# Patient Record
Sex: FEMALE | Race: WHITE
[De-identification: names, ages, dates, MRNs, and addresses within clinical notes are randomized per-mention and may not be internally consistent; named-entity substitution may affect disease eponyms.]

---

## 2017-06-02 ENCOUNTER — HOSPITAL ENCOUNTER (EMERGENCY)
Dept: HOSPITAL 1 - ED | Age: 42
Discharge: HOME | End: 2017-06-02
Payer: COMMERCIAL

## 2017-06-02 VITALS — HEIGHT: 65 IN | WEIGHT: 195.99 LBS | BODY MASS INDEX: 32.65 KG/M2

## 2017-06-02 VITALS — SYSTOLIC BLOOD PRESSURE: 127 MMHG | DIASTOLIC BLOOD PRESSURE: 71 MMHG

## 2017-06-02 DIAGNOSIS — M54.5: ICD-10-CM

## 2017-06-02 DIAGNOSIS — G89.29: Primary | ICD-10-CM

## 2019-01-07 ENCOUNTER — HOSPITAL ENCOUNTER (EMERGENCY)
Dept: HOSPITAL 1 - ED | Age: 44
Discharge: LEFT BEFORE DISCHARGE | End: 2019-01-07
Payer: COMMERCIAL

## 2019-01-07 VITALS — HEIGHT: 66 IN | WEIGHT: 185 LBS | BODY MASS INDEX: 29.73 KG/M2

## 2019-01-07 VITALS — SYSTOLIC BLOOD PRESSURE: 141 MMHG | DIASTOLIC BLOOD PRESSURE: 84 MMHG

## 2019-01-07 DIAGNOSIS — Z53.21: Primary | ICD-10-CM

## 2019-09-01 ENCOUNTER — HOSPITAL ENCOUNTER (EMERGENCY)
Dept: HOSPITAL 1 - ED | Age: 44
LOS: 1 days | Discharge: HOME | End: 2019-09-02
Payer: MEDICAID

## 2019-09-01 VITALS
HEIGHT: 65 IN | WEIGHT: 177 LBS | HEIGHT: 65 IN | BODY MASS INDEX: 29.49 KG/M2 | BODY MASS INDEX: 29.49 KG/M2 | WEIGHT: 177 LBS
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
## 2019-09-01 DIAGNOSIS — S39.012A: Primary | ICD-10-CM
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
## 2019-09-01 DIAGNOSIS — Z98.890: ICD-10-CM

## 2019-09-01 DIAGNOSIS — F15.10: ICD-10-CM
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
## 2019-09-02 VITALS — SYSTOLIC BLOOD PRESSURE: 123 MMHG | DIASTOLIC BLOOD PRESSURE: 63 MMHG

## 2019-09-02 LAB — AMPHETAMINES UR QL SCN: POSITIVE

## 2019-10-19 ENCOUNTER — HOSPITAL ENCOUNTER (INPATIENT)
Dept: HOSPITAL 26 - MED | Age: 44
LOS: 2 days | Discharge: PSYCHIATRIC OR SUBSTANCE USE FACILITY | DRG: 425 | End: 2019-10-21
Attending: GENERAL PRACTICE | Admitting: GENERAL PRACTICE
Payer: MEDICAID

## 2019-10-19 VITALS — HEIGHT: 66 IN | WEIGHT: 173 LBS | BODY MASS INDEX: 27.8 KG/M2

## 2019-10-19 VITALS — SYSTOLIC BLOOD PRESSURE: 136 MMHG | DIASTOLIC BLOOD PRESSURE: 86 MMHG

## 2019-10-19 DIAGNOSIS — Z59.0: ICD-10-CM
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
## 2019-10-19 DIAGNOSIS — F10.129: ICD-10-CM

## 2019-10-19 DIAGNOSIS — D72.829: ICD-10-CM

## 2019-10-19 DIAGNOSIS — I10: ICD-10-CM

## 2019-10-19 DIAGNOSIS — F32.9: ICD-10-CM

## 2019-10-19 DIAGNOSIS — E83.42: Primary | ICD-10-CM

## 2019-10-19 DIAGNOSIS — F41.9: ICD-10-CM

## 2019-10-19 DIAGNOSIS — R45.851: ICD-10-CM

## 2019-10-19 DIAGNOSIS — E83.51: ICD-10-CM

## 2019-10-19 LAB
ALBUMIN FLD-MCNC: 3.6 G/DL (ref 3.4–5)
ANION GAP SERPL CALCULATED.3IONS-SCNC: 17 MMOL/L (ref 8–16)
APAP SERPL-MCNC: < 0.5 UG/ML (ref 10–30)
AST SERPL-CCNC: 33 U/L (ref 15–37)
BARBITURATES UR QL SCN: (no result) NG/ML
BASOPHILS # BLD AUTO: 0.1 K/UL (ref 0–0.22)
BASOPHILS NFR BLD AUTO: 0.6 % (ref 0–2)
BENZODIAZ UR QL SCN: (no result) NG/ML
BILIRUB SERPL-MCNC: 0.4 MG/DL (ref 0–1)
BUN SERPL-MCNC: 16 MG/DL (ref 7–18)
BZE UR QL SCN: (no result) NG/ML
CANNABINOIDS UR QL SCN: (no result) NG/ML
CHLORIDE SERPL-SCNC: 105 MMOL/L (ref 98–107)
CO2 SERPL-SCNC: 22.7 MMOL/L (ref 21–32)
CREAT SERPL-MCNC: 0.5 MG/DL (ref 0.6–1.3)
EOSINOPHIL # BLD AUTO: 0.1 K/UL (ref 0–0.4)
EOSINOPHIL NFR BLD AUTO: 0.7 % (ref 0–4)
ERYTHROCYTE [DISTWIDTH] IN BLOOD BY AUTOMATED COUNT: 15.5 % (ref 11.6–13.7)
GFR SERPL CREATININE-BSD FRML MDRD: 172 ML/MIN (ref 90–?)
GLUCOSE SERPL-MCNC: 117 MG/DL (ref 74–106)
HCT VFR BLD AUTO: 42.1 % (ref 36–48)
HGB BLD-MCNC: 14.1 G/DL (ref 12–16)
LYMPHOCYTES # BLD AUTO: 4 K/UL (ref 2.5–16.5)
LYMPHOCYTES NFR BLD AUTO: 26.7 % (ref 20.5–51.1)
MCH RBC QN AUTO: 32 PG (ref 27–31)
MCHC RBC AUTO-ENTMCNC: 34 G/DL (ref 33–37)
MCV RBC AUTO: 95.8 FL (ref 80–94)
MONOCYTES # BLD AUTO: 0.8 K/UL (ref 0.8–1)
MONOCYTES NFR BLD AUTO: 5.5 % (ref 1.7–9.3)
NEUTROPHILS # BLD AUTO: 10 K/UL (ref 1.8–7.7)
NEUTROPHILS NFR BLD AUTO: 66.5 % (ref 42.2–75.2)
OPIATES UR QL SCN: (no result) NG/ML
PCP UR QL SCN: (no result) NG/ML
PLATELET # BLD AUTO: 373 K/UL (ref 140–450)
POTASSIUM SERPL-SCNC: 3.7 MMOL/L (ref 3.5–5.1)
RBC # BLD AUTO: 4.4 MIL/UL (ref 4.2–5.4)
SALICYLATES SERPL-MCNC: < 2.8 MG/DL (ref 2.8–20)
SODIUM SERPL-SCNC: 141 MMOL/L (ref 136–145)
WBC # BLD AUTO: 15 K/UL (ref 4.8–10.8)

## 2019-10-19 PROCEDURE — G0482 DRUG TEST DEF 15-21 CLASSES: HCPCS

## 2019-10-19 PROCEDURE — A9153 MULTI-VITAMIN NOS: HCPCS

## 2019-10-19 PROCEDURE — G0480 DRUG TEST DEF 1-7 CLASSES: HCPCS

## 2019-10-19 SDOH — ECONOMIC STABILITY - HOUSING INSECURITY: HOMELESSNESS: Z59.0

## 2019-10-20 VITALS — DIASTOLIC BLOOD PRESSURE: 94 MMHG | SYSTOLIC BLOOD PRESSURE: 150 MMHG

## 2019-10-20 VITALS — DIASTOLIC BLOOD PRESSURE: 79 MMHG | SYSTOLIC BLOOD PRESSURE: 140 MMHG

## 2019-10-20 VITALS — DIASTOLIC BLOOD PRESSURE: 90 MMHG | SYSTOLIC BLOOD PRESSURE: 142 MMHG

## 2019-10-20 LAB
AMYLASE SERPL-CCNC: 42 U/L (ref 25–115)
APPEARANCE UR: CLEAR
BILIRUB UR QL STRIP: NEGATIVE
COLOR UR: (no result)
GLUCOSE UR STRIP-MCNC: NEGATIVE MG/DL
HGB UR QL STRIP: NEGATIVE
LEUKOCYTE ESTERASE UR QL STRIP: NEGATIVE
LIPASE SERPL-CCNC: 106 U/L (ref 73–393)
MAGNESIUM SERPL-MCNC: 1.5 MG/DL (ref 1.8–2.4)
NITRITE UR QL STRIP: NEGATIVE
PH UR STRIP: 7 [PH] (ref 5–9)
PHOSPHATE SERPL-MCNC: 3 MG/DL (ref 2.5–4.9)
PROTHROMBIN TIME: 10 SECS (ref 10.8–13.4)
TSH SERPL DL<=0.05 MIU/L-ACNC: 1.15 UIU/ML (ref 0.34–3.74)

## 2019-10-20 RX ADMIN — SODIUM CHLORIDE SCH MLS/HR: 9 INJECTION, SOLUTION INTRAVENOUS at 23:04

## 2019-10-20 RX ADMIN — MORPHINE SULFATE PRN MG: 2 INJECTION, SOLUTION INTRAMUSCULAR; INTRAVENOUS at 21:27

## 2019-10-20 RX ADMIN — DULOXETINE HYDROCHLORIDE SCH MG: 30 CAPSULE, DELAYED RELEASE ORAL at 21:27

## 2019-10-21 VITALS — SYSTOLIC BLOOD PRESSURE: 128 MMHG | DIASTOLIC BLOOD PRESSURE: 87 MMHG

## 2019-10-21 LAB
ALBUMIN FLD-MCNC: 3.3 G/DL (ref 3.4–5)
ANION GAP SERPL CALCULATED.3IONS-SCNC: 14.5 MMOL/L (ref 8–16)
AST SERPL-CCNC: 45 U/L (ref 15–37)
BASOPHILS # BLD AUTO: 0 K/UL (ref 0–0.22)
BASOPHILS NFR BLD AUTO: 0.3 % (ref 0–2)
BILIRUB SERPL-MCNC: 0.8 MG/DL (ref 0–1)
BUN SERPL-MCNC: 10 MG/DL (ref 7–18)
CHLORIDE SERPL-SCNC: 103 MMOL/L (ref 98–107)
CHOLEST/HDLC SERPL: 3.7 {RATIO} (ref 1–4.5)
CO2 SERPL-SCNC: 25.4 MMOL/L (ref 21–32)
CREAT SERPL-MCNC: 0.5 MG/DL (ref 0.6–1.3)
EOSINOPHIL # BLD AUTO: 0.2 K/UL (ref 0–0.4)
EOSINOPHIL NFR BLD AUTO: 3.1 % (ref 0–4)
ERYTHROCYTE [DISTWIDTH] IN BLOOD BY AUTOMATED COUNT: 15.1 % (ref 11.6–13.7)
GFR SERPL CREATININE-BSD FRML MDRD: 172 ML/MIN (ref 90–?)
GLUCOSE SERPL-MCNC: 93 MG/DL (ref 74–106)
HCT VFR BLD AUTO: 38.2 % (ref 36–48)
HDLC SERPL-MCNC: 42 MG/DL (ref 40–60)
HGB BLD-MCNC: 12.7 G/DL (ref 12–16)
LDLC SERPL CALC-MCNC: 66 MG/DL (ref 60–100)
LYMPHOCYTES # BLD AUTO: 2.7 K/UL (ref 2.5–16.5)
LYMPHOCYTES NFR BLD AUTO: 38 % (ref 20.5–51.1)
MAGNESIUM SERPL-MCNC: 1.9 MG/DL (ref 1.8–2.4)
MCH RBC QN AUTO: 32 PG (ref 27–31)
MCHC RBC AUTO-ENTMCNC: 33 G/DL (ref 33–37)
MCV RBC AUTO: 97.2 FL (ref 80–94)
MONOCYTES # BLD AUTO: 0.7 K/UL (ref 0.8–1)
MONOCYTES NFR BLD AUTO: 9.5 % (ref 1.7–9.3)
NEUTROPHILS # BLD AUTO: 3.5 K/UL (ref 1.8–7.7)
NEUTROPHILS NFR BLD AUTO: 49.1 % (ref 42.2–75.2)
PHOSPHATE SERPL-MCNC: 3.5 MG/DL (ref 2.5–4.9)
PLATELET # BLD AUTO: 272 K/UL (ref 140–450)
POTASSIUM SERPL-SCNC: 3.9 MMOL/L (ref 3.5–5.1)
RBC # BLD AUTO: 3.93 MIL/UL (ref 4.2–5.4)
SODIUM SERPL-SCNC: 139 MMOL/L (ref 136–145)
T4 SERPL-MCNC: 5.5 UG/DL (ref 4.5–12)
TRIGL SERPL-MCNC: 237 MG/DL (ref 30–150)
WBC # BLD AUTO: 7 K/UL (ref 4.8–10.8)

## 2019-10-21 RX ADMIN — SODIUM CHLORIDE SCH MLS/HR: 9 INJECTION, SOLUTION INTRAVENOUS at 14:40

## 2019-10-21 RX ADMIN — MORPHINE SULFATE PRN MG: 2 INJECTION, SOLUTION INTRAMUSCULAR; INTRAVENOUS at 08:29

## 2019-10-21 RX ADMIN — MORPHINE SULFATE PRN MG: 2 INJECTION, SOLUTION INTRAMUSCULAR; INTRAVENOUS at 14:09

## 2019-10-21 RX ADMIN — DULOXETINE HYDROCHLORIDE SCH MG: 30 CAPSULE, DELAYED RELEASE ORAL at 08:24

## 2019-12-05 ENCOUNTER — HOSPITAL ENCOUNTER (INPATIENT)
Dept: HOSPITAL 26 - MED | Age: 44
LOS: 4 days | Discharge: PSYCHIATRIC OR SUBSTANCE USE FACILITY | DRG: 817 | End: 2019-12-09
Attending: FAMILY MEDICINE | Admitting: FAMILY MEDICINE
Payer: MEDICAID

## 2019-12-05 VITALS — HEIGHT: 66 IN | BODY MASS INDEX: 28.12 KG/M2 | WEIGHT: 175 LBS

## 2019-12-05 DIAGNOSIS — F19.10: ICD-10-CM

## 2019-12-05 DIAGNOSIS — I10: ICD-10-CM

## 2019-12-05 DIAGNOSIS — R45.851: ICD-10-CM

## 2019-12-05 DIAGNOSIS — E87.6: ICD-10-CM

## 2019-12-05 DIAGNOSIS — T42.4X2A: Primary | ICD-10-CM

## 2019-12-05 DIAGNOSIS — F32.9: ICD-10-CM

## 2019-12-05 DIAGNOSIS — F41.9: ICD-10-CM

## 2019-12-05 DIAGNOSIS — M54.30: ICD-10-CM

## 2019-12-05 DIAGNOSIS — T50.905A: ICD-10-CM

## 2019-12-05 DIAGNOSIS — G92: ICD-10-CM
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
## 2019-12-05 DIAGNOSIS — T40.7X2A: ICD-10-CM

## 2019-12-05 PROCEDURE — G0482 DRUG TEST DEF 15-21 CLASSES: HCPCS

## 2019-12-05 PROCEDURE — A9153 MULTI-VITAMIN NOS: HCPCS

## 2019-12-07 VITALS — DIASTOLIC BLOOD PRESSURE: 87 MMHG | SYSTOLIC BLOOD PRESSURE: 130 MMHG

## 2019-12-07 LAB
ALBUMIN FLD-MCNC: 2.8 G/DL (ref 3.4–5)
ALBUMIN FLD-MCNC: 4.4 G/DL (ref 3.4–5)
ANION GAP SERPL CALCULATED.3IONS-SCNC: 12.9 MMOL/L (ref 8–16)
ANION GAP SERPL CALCULATED.3IONS-SCNC: 16.2 MMOL/L (ref 8–16)
APPEARANCE UR: CLEAR
AST SERPL-CCNC: 46 U/L (ref 15–37)
AST SERPL-CCNC: 52 U/L (ref 15–37)
BARBITURATES UR QL SCN: NEGATIVE NG/ML
BASOPHILS # BLD AUTO: 0 K/UL (ref 0–0.22)
BASOPHILS NFR BLD AUTO: 0.7 % (ref 0–2)
BENZODIAZ UR QL SCN: POSITIVE NG/ML
BILIRUB SERPL-MCNC: 0.3 MG/DL (ref 0–1)
BILIRUB SERPL-MCNC: 0.5 MG/DL (ref 0–1)
BILIRUB UR QL STRIP: NEGATIVE
BUN SERPL-MCNC: 15 MG/DL (ref 7–18)
BUN SERPL-MCNC: 8 MG/DL (ref 7–18)
BZE UR QL SCN: NEGATIVE NG/ML
CANNABINOIDS UR QL SCN: POSITIVE NG/ML
CHLORIDE SERPL-SCNC: 103 MMOL/L (ref 98–107)
CHLORIDE SERPL-SCNC: 107 MMOL/L (ref 98–107)
CO2 SERPL-SCNC: 23.6 MMOL/L (ref 21–32)
CO2 SERPL-SCNC: 25.1 MMOL/L (ref 21–32)
COLOR UR: YELLOW
CREAT SERPL-MCNC: 0.6 MG/DL (ref 0.6–1.3)
CREAT SERPL-MCNC: 0.6 MG/DL (ref 0.6–1.3)
EOSINOPHIL # BLD AUTO: 0.2 K/UL (ref 0–0.4)
EOSINOPHIL NFR BLD AUTO: 2.8 % (ref 0–4)
ERYTHROCYTE [DISTWIDTH] IN BLOOD BY AUTOMATED COUNT: 14.9 % (ref 11.6–13.7)
GFR SERPL CREATININE-BSD FRML MDRD: 139 ML/MIN (ref 90–?)
GFR SERPL CREATININE-BSD FRML MDRD: 139 ML/MIN (ref 90–?)
GLUCOSE SERPL-MCNC: 113 MG/DL (ref 74–106)
GLUCOSE SERPL-MCNC: 90 MG/DL (ref 74–106)
GLUCOSE UR STRIP-MCNC: NEGATIVE MG/DL
HCT VFR BLD AUTO: 32.8 % (ref 36–48)
HGB BLD-MCNC: 11 G/DL (ref 12–16)
HGB UR QL STRIP: NEGATIVE
LEUKOCYTE ESTERASE UR QL STRIP: NEGATIVE
LYMPHOCYTES # BLD AUTO: 2.5 K/UL (ref 2.5–16.5)
LYMPHOCYTES NFR BLD AUTO: 40.8 % (ref 20.5–51.1)
MAGNESIUM SERPL-MCNC: 1.7 MG/DL (ref 1.8–2.4)
MAGNESIUM SERPL-MCNC: 2 MG/DL (ref 1.8–2.4)
MCH RBC QN AUTO: 32 PG (ref 27–31)
MCHC RBC AUTO-ENTMCNC: 34 G/DL (ref 33–37)
MCV RBC AUTO: 93.3 FL (ref 80–94)
MONOCYTES # BLD AUTO: 0.5 K/UL (ref 0.8–1)
MONOCYTES NFR BLD AUTO: 7.4 % (ref 1.7–9.3)
NEUTROPHILS # BLD AUTO: 3 K/UL (ref 1.8–7.7)
NEUTROPHILS NFR BLD AUTO: 48.3 % (ref 42.2–75.2)
NITRITE UR QL STRIP: NEGATIVE
OPIATES UR QL SCN: NEGATIVE NG/ML
PCP UR QL SCN: NEGATIVE NG/ML
PH UR STRIP: 6 [PH] (ref 5–9)
PHOSPHATE SERPL-MCNC: 2.6 MG/DL (ref 2.5–4.9)
PHOSPHATE SERPL-MCNC: 3.1 MG/DL (ref 2.5–4.9)
PLATELET # BLD AUTO: 282 K/UL (ref 140–450)
POTASSIUM SERPL-SCNC: 3.3 MMOL/L (ref 3.5–5.1)
POTASSIUM SERPL-SCNC: 3.5 MMOL/L (ref 3.5–5.1)
RBC # BLD AUTO: 3.51 MIL/UL (ref 4.2–5.4)
SODIUM SERPL-SCNC: 140 MMOL/L (ref 136–145)
SODIUM SERPL-SCNC: 141 MMOL/L (ref 136–145)
WBC # BLD AUTO: 6.2 K/UL (ref 4.8–10.8)

## 2019-12-07 RX ADMIN — FAMOTIDINE SCH MG: 20 TABLET ORAL at 20:45

## 2019-12-07 RX ADMIN — LACTULOSE SCH GM: 10 SOLUTION ORAL at 20:43

## 2019-12-07 RX ADMIN — SODIUM CHLORIDE SCH MLS/HR: 9 INJECTION, SOLUTION INTRAVENOUS at 05:30

## 2019-12-07 RX ADMIN — HYDROCODONE BITARTRATE AND ACETAMINOPHEN PRN TAB: 5; 325 TABLET ORAL at 14:22

## 2019-12-07 RX ADMIN — SODIUM CHLORIDE SCH MLS/HR: 9 INJECTION, SOLUTION INTRAVENOUS at 10:00

## 2019-12-07 RX ADMIN — DULOXETINE HYDROCHLORIDE SCH MG: 30 CAPSULE, DELAYED RELEASE ORAL at 20:44

## 2019-12-07 NOTE — NUR
Pt said that she wants to kill herself. Pt was crying. Pt was resting in bed at this time. 
Sitter by bedside.

## 2019-12-07 NOTE — NUR
RECEIVED PT IN STABLE CONDITION FROM AM NURSE, MED SURG PT. ON 5150 HOLD. 1:1 SITTER IN 
ATTENDANCE. AMBULATORY. WITH IV FLUIDS JUST RESTARTED ON A NEW IV ACCESS LT AC g20. CLEAR 
AND PATENT. NO C/O ANY DISCOMFORT NOR PAIN NOTED AT THIS TIME. BED ON LOW POSITION. WILL 
CONTINUE TO MONITOR.

## 2019-12-07 NOTE — NUR
Pt's medication hydrochlorothiazide 12.5 mg and Losartan Potassium 50 mg was given. Unable 
to scan with scanner and manual entry is not working.

## 2019-12-08 VITALS — SYSTOLIC BLOOD PRESSURE: 136 MMHG | DIASTOLIC BLOOD PRESSURE: 91 MMHG

## 2019-12-08 VITALS — SYSTOLIC BLOOD PRESSURE: 132 MMHG | DIASTOLIC BLOOD PRESSURE: 98 MMHG

## 2019-12-08 VITALS — SYSTOLIC BLOOD PRESSURE: 122 MMHG | DIASTOLIC BLOOD PRESSURE: 80 MMHG

## 2019-12-08 LAB
AMYLASE SERPL-CCNC: 71 U/L (ref 25–115)
ANION GAP SERPL CALCULATED.3IONS-SCNC: 13.7 MMOL/L (ref 8–16)
ANION GAP SERPL CALCULATED.3IONS-SCNC: 15.8 MMOL/L (ref 8–16)
BASOPHILS # BLD AUTO: 0.4 K/UL (ref 0–0.22)
BASOPHILS NFR BLD AUTO: 4.6 % (ref 0–2)
BUN SERPL-MCNC: 12 MG/DL (ref 7–18)
BUN SERPL-MCNC: 20 MG/DL (ref 7–18)
CHLORIDE SERPL-SCNC: 105 MMOL/L (ref 98–107)
CHLORIDE SERPL-SCNC: 108 MMOL/L (ref 98–107)
CHOLEST/HDLC SERPL: 2.3 {RATIO} (ref 1–4.5)
CO2 SERPL-SCNC: 22.9 MMOL/L (ref 21–32)
CO2 SERPL-SCNC: 23 MMOL/L (ref 21–32)
CREAT SERPL-MCNC: 0.6 MG/DL (ref 0.6–1.3)
CREAT SERPL-MCNC: 0.7 MG/DL (ref 0.6–1.3)
EOSINOPHIL # BLD AUTO: 0.3 K/UL (ref 0–0.4)
EOSINOPHIL NFR BLD AUTO: 4.2 % (ref 0–4)
ERYTHROCYTE [DISTWIDTH] IN BLOOD BY AUTOMATED COUNT: 15.3 % (ref 11.6–13.7)
GFR SERPL CREATININE-BSD FRML MDRD: 116 ML/MIN (ref 90–?)
GFR SERPL CREATININE-BSD FRML MDRD: 139 ML/MIN (ref 90–?)
GLUCOSE SERPL-MCNC: 109 MG/DL (ref 74–106)
GLUCOSE SERPL-MCNC: 91 MG/DL (ref 74–106)
HCT VFR BLD AUTO: 37.1 % (ref 36–48)
HDLC SERPL-MCNC: 55 MG/DL (ref 40–60)
HGB BLD-MCNC: 12.2 G/DL (ref 12–16)
LDLC SERPL CALC-MCNC: 18 MG/DL (ref 60–100)
LIPASE SERPL-CCNC: 143 U/L (ref 73–393)
LYMPHOCYTES # BLD AUTO: 2.3 K/UL (ref 2.5–16.5)
LYMPHOCYTES NFR BLD AUTO: 28 % (ref 20.5–51.1)
MAGNESIUM SERPL-MCNC: 1.8 MG/DL (ref 1.8–2.4)
MCH RBC QN AUTO: 31 PG (ref 27–31)
MCHC RBC AUTO-ENTMCNC: 33 G/DL (ref 33–37)
MCV RBC AUTO: 93.5 FL (ref 80–94)
MONOCYTES # BLD AUTO: 0.6 K/UL (ref 0.8–1)
MONOCYTES NFR BLD AUTO: 7 % (ref 1.7–9.3)
NEUTROPHILS # BLD AUTO: 4.5 K/UL (ref 1.8–7.7)
NEUTROPHILS NFR BLD AUTO: 56.2 % (ref 42.2–75.2)
PHOSPHATE SERPL-MCNC: 3.4 MG/DL (ref 2.5–4.9)
PLATELET # BLD AUTO: 360 K/UL (ref 140–450)
POTASSIUM SERPL-SCNC: 3.7 MMOL/L (ref 3.5–5.1)
POTASSIUM SERPL-SCNC: 3.7 MMOL/L (ref 3.5–5.1)
RBC # BLD AUTO: 3.97 MIL/UL (ref 4.2–5.4)
SODIUM SERPL-SCNC: 140 MMOL/L (ref 136–145)
SODIUM SERPL-SCNC: 141 MMOL/L (ref 136–145)
TRIGL SERPL-MCNC: 274 MG/DL (ref 30–150)
TSH SERPL DL<=0.05 MIU/L-ACNC: 2.48 UIU/ML (ref 0.34–3.74)
WBC # BLD AUTO: 8 K/UL (ref 4.8–10.8)

## 2019-12-08 RX ADMIN — HYDROCODONE BITARTRATE AND ACETAMINOPHEN PRN TAB: 5; 325 TABLET ORAL at 05:21

## 2019-12-08 RX ADMIN — SODIUM CHLORIDE SCH MLS/HR: 9 INJECTION, SOLUTION INTRAVENOUS at 08:44

## 2019-12-08 RX ADMIN — DULOXETINE HYDROCHLORIDE SCH MG: 30 CAPSULE, DELAYED RELEASE ORAL at 09:15

## 2019-12-08 RX ADMIN — LACTULOSE SCH GM: 10 SOLUTION ORAL at 09:13

## 2019-12-08 RX ADMIN — HYDROCODONE BITARTRATE AND ACETAMINOPHEN PRN TAB: 5; 325 TABLET ORAL at 20:15

## 2019-12-08 RX ADMIN — DULOXETINE HYDROCHLORIDE SCH MG: 30 CAPSULE, DELAYED RELEASE ORAL at 20:15

## 2019-12-08 RX ADMIN — LACTULOSE SCH GM: 10 SOLUTION ORAL at 20:14

## 2019-12-08 RX ADMIN — SODIUM CHLORIDE SCH MLS/HR: 9 INJECTION, SOLUTION INTRAVENOUS at 16:25

## 2019-12-08 RX ADMIN — FAMOTIDINE SCH MG: 20 TABLET ORAL at 09:15

## 2019-12-08 RX ADMIN — FAMOTIDINE SCH MG: 20 TABLET ORAL at 20:15

## 2019-12-08 RX ADMIN — LOSARTAN POTASSIUM SCH MG: 50 TABLET, FILM COATED ORAL at 09:14

## 2019-12-08 RX ADMIN — HYDROCHLOROTHIAZIDE SCH MG: 25 TABLET ORAL at 09:16

## 2019-12-08 NOTE — NUR
ENDORSED PT TO BELTRAN PEREZ CHARGE FOR CONTINUITY OF CARE. PT IN STABLE CONDITION.WITH 1;1 
SITTER FOR 5150 /SI .

## 2019-12-08 NOTE — NUR
RECD. AMBULATING FROM THE BR. AWAKE, A/OX4. RESPIRATION EVEN AND UNLABORED. IV OF NS  
ML/HR INFUSING, LEFT AC G 20. WHEN INQUIRED IF SHE HAS THOUGHTS OF HURTING SELF STATED NO. 
PLAN OF CARE FOR THE SHIFT DISCUSSED. VERBALIZED UNDERSTANDING. 1:1 SITTER MONITORING 
PATIENT NEAR DOOR. DENIES PAIN 0/10.

## 2019-12-08 NOTE — NUR
RECEIVED PT FROM NIGHT SHIFT CHARGE [facility], PT IS SLEEPING ON THE BED WITH, RESPIRATION 
IS EVEN, 1:1 SITTER ON THE BEDSIDE, ROOM CHECKED FOR ANY HAZARDOUS OBJECTS, PT HAS AN IV 
LINE ON THE LEFT AC G. 20 WITH NS INFUSING AT 100ML/HR, NO SIGN OF DISTRESS NOTED AND WILL 
MONITOR PT.

## 2019-12-08 NOTE — NUR
PT IS AWAKE AND SEATED ON THE BED, 1; SITTER ON THE BEDSIDE, BP /91, PULSE , 
ORAL MEDICATIONS WERE GIVEN AND TOLERATED IT, WILL MONITOR PT.

## 2019-12-08 NOTE — NUR
HAD C/O GENERALIZED PAIN.NORCO PO GIVEN EARLIER.PT ASKED FOR ATIVAN EXPLAINED FOR HER THAT 
HAS NO ORDER.SHE SAID SHE REALLY NEED IT.PROMISE HER TO TALK W/RESIDENT BUT I WILL NOT GIVE 
IT W/NORCO TOGETHER MAY BE AFTER 2 HRS.SHE ACCEPTED.ASKED  RESIDENT HE ORDERED. BUT PT 
SLEPT AND DIDN'T ASK FOR ATIVAN.

## 2019-12-08 NOTE — NUR
RECD. AMBULATING FROM THE BR. AWAKE, A/OX4. RESPIRATION EVEN AND UNLABORED. IV OF NS  
ML/HR INFUSING, LEFT AC G 20. WHEN INQUIRED IF SHE HAS THOUGHTS OF HURTING SELF STATED NO. 
PLAN OF CARE FOR THE SHIFT DISCUSSED. VERBALIZED UNDERSTANDING. 1:1 SITTER MONITORING 
PATIENT NEAR DOOR. DENIES PAIN 0/10.

-------------------------------------------------------------------------------

Addendum: 12/08/19 at 2032 by Elizabeth Bernal LVN

-------------------------------------------------------------------------------

CORRECTION: THIS CHARTING IS NOT FOR THIS PATIENT.

-------------------------------------------------------------------------------

Addendum: 12/08/19 at 2034 by Elizabeth Bernal LVN

-------------------------------------------------------------------------------

CORRECTION: THIS CHARTING IS FOR THIS PATIENT.

-------------------------------------------------------------------------------

Addendum: 12/08/19 at 2035 by Elizabeth Bernal LVN

-------------------------------------------------------------------------------

CORRECTION: MADE ADDENDUM IN ERROR. THIS CHARTING IS FOR THIS PATIENT.

## 2019-12-08 NOTE — NUR
PATIENT SITTING IN BED COMFORTABLY. NO DISTRESS NOTED. SCHEDULED MEDICATIONS DUE GIVEN. WILL 
CONTINUE TO MONITOR.

## 2019-12-08 NOTE — NUR
PT WAS GIVEN SCHEDULED ORAL MEDICATIONS NOW, BP /86, PULSE IS 97, O2 SATURATION IS 
98%, DAUGHTER AND 1: SITTER ON THE BEDSIDE, WILL MONITOR PT.

## 2019-12-09 VITALS — DIASTOLIC BLOOD PRESSURE: 83 MMHG | SYSTOLIC BLOOD PRESSURE: 129 MMHG

## 2019-12-09 VITALS — SYSTOLIC BLOOD PRESSURE: 123 MMHG | DIASTOLIC BLOOD PRESSURE: 54 MMHG

## 2019-12-09 VITALS — DIASTOLIC BLOOD PRESSURE: 80 MMHG | SYSTOLIC BLOOD PRESSURE: 120 MMHG

## 2019-12-09 LAB
ANION GAP SERPL CALCULATED.3IONS-SCNC: 13.9 MMOL/L (ref 8–16)
BASOPHILS # BLD AUTO: 0.1 K/UL (ref 0–0.22)
BASOPHILS NFR BLD AUTO: 0.9 % (ref 0–2)
BUN SERPL-MCNC: 13 MG/DL (ref 7–18)
CHLORIDE SERPL-SCNC: 107 MMOL/L (ref 98–107)
CO2 SERPL-SCNC: 24 MMOL/L (ref 21–32)
CREAT SERPL-MCNC: 0.5 MG/DL (ref 0.6–1.3)
EOSINOPHIL # BLD AUTO: 0.2 K/UL (ref 0–0.4)
EOSINOPHIL NFR BLD AUTO: 3 % (ref 0–4)
ERYTHROCYTE [DISTWIDTH] IN BLOOD BY AUTOMATED COUNT: 15.2 % (ref 11.6–13.7)
GFR SERPL CREATININE-BSD FRML MDRD: 172 ML/MIN (ref 90–?)
GLUCOSE SERPL-MCNC: 97 MG/DL (ref 74–106)
HCT VFR BLD AUTO: 33.9 % (ref 36–48)
HGB BLD-MCNC: 11.2 G/DL (ref 12–16)
LYMPHOCYTES # BLD AUTO: 2.1 K/UL (ref 2.5–16.5)
LYMPHOCYTES NFR BLD AUTO: 32.4 % (ref 20.5–51.1)
MAGNESIUM SERPL-MCNC: 1.8 MG/DL (ref 1.8–2.4)
MCH RBC QN AUTO: 31 PG (ref 27–31)
MCHC RBC AUTO-ENTMCNC: 33 G/DL (ref 33–37)
MCV RBC AUTO: 94 FL (ref 80–94)
MONOCYTES # BLD AUTO: 0.5 K/UL (ref 0.8–1)
MONOCYTES NFR BLD AUTO: 7.7 % (ref 1.7–9.3)
NEUTROPHILS # BLD AUTO: 3.6 K/UL (ref 1.8–7.7)
NEUTROPHILS NFR BLD AUTO: 56 % (ref 42.2–75.2)
PHOSPHATE SERPL-MCNC: 4.1 MG/DL (ref 2.5–4.9)
PLATELET # BLD AUTO: 292 K/UL (ref 140–450)
POTASSIUM SERPL-SCNC: 3.9 MMOL/L (ref 3.5–5.1)
RBC # BLD AUTO: 3.61 MIL/UL (ref 4.2–5.4)
SODIUM SERPL-SCNC: 141 MMOL/L (ref 136–145)
WBC # BLD AUTO: 6.5 K/UL (ref 4.8–10.8)

## 2019-12-09 RX ADMIN — HYDROCHLOROTHIAZIDE SCH MG: 25 TABLET ORAL at 09:16

## 2019-12-09 RX ADMIN — DULOXETINE HYDROCHLORIDE SCH MG: 30 CAPSULE, DELAYED RELEASE ORAL at 09:16

## 2019-12-09 RX ADMIN — LOSARTAN POTASSIUM SCH MG: 50 TABLET, FILM COATED ORAL at 09:17

## 2019-12-09 RX ADMIN — LACTULOSE SCH GM: 10 SOLUTION ORAL at 09:16

## 2019-12-09 RX ADMIN — FAMOTIDINE SCH MG: 20 TABLET ORAL at 09:16

## 2019-12-09 RX ADMIN — SODIUM CHLORIDE SCH MLS/HR: 9 INJECTION, SOLUTION INTRAVENOUS at 02:00

## 2019-12-09 RX ADMIN — SODIUM CHLORIDE SCH MLS/HR: 9 INJECTION, SOLUTION INTRAVENOUS at 12:00

## 2019-12-09 NOTE — NUR
ADMINISTERED SCHEDULED MEDS AS PER MD ORDER, PATIENT IS SITTING UP ON BED. DENIED SUICIDAL 
IDEATION AND HEARING VOICES. NO SIGNS OF DISTRESS NOTE. SAFETY MEASURES IN PLACE AND 1:1 
SITTER BY BEDSIDE.

## 2019-12-09 NOTE — NUR
Note:



Late entry for 12/8/19: I faxed referral to [facility]. I called and spoke with 
Dioni from [facility], I was able to confirmed he received referral.

## 2019-12-09 NOTE — NUR
ADMINISTERED MEDS AS PER MD ORDER, MEDS ED PROVIDED TO PATIENT AND PATIENT VERBALIZED 
UNDERSTANDING. PATIENT IS AAOX4. PATIENT DENIED HEARING VOICE, HALLUCINATION, AND ANY 
SUICIDAL IDEATION. NO SIGNS OF DISTRESS NOTED. PATIENT IS RESTING ON BED AT THIS TIME. NO 
SIGNS OF DISTRESS NOTED. SAFETY MEASURES IN PLACE. BED IN LOW POSITION AND 1:1 SITTER BY 
BEDSIDE.

## 2019-12-09 NOTE — NUR
CALLED [facility] [phone] AND FULL REPORT GAVE TO JESSICA KAUR, ANSWERED 
ALL JESSICA VITALE'S QUESTIONS AND PROVIDED A CALL BACK # FOR FURTHER QUESTIONS.

## 2019-12-09 NOTE — NUR
PATIENT REFUSED TO BE TRANSFER AND INSISTED TO LEAVE THE UNIT. EXPLAINED TO PT THAT SHE IS 
STILL ON HOLD AND  RENEWED THE HOLD ON 12/08/19. HOUSE SUPERVISOR SPOKE TO PATIENT AND 
PATIENT AGREED TO BE TRANSFER.

## 2019-12-09 NOTE — NUR
UNABLE TO SCAN MEDICATION, [id number] SAYS MISSING OE ORDER BUTTON, CALLED PHARMACY, WILL PUT 
IT AGAIN. STILL UNABLE TO SCAN, NICOTINE PATCH PUT ON LEFT UPPER ARM OF PATIENT.

## 2019-12-09 NOTE — NUR
Received report from PM shift. Will follow up with surrounding Psych facilities to locate 
appropriate placement.

## 2019-12-09 NOTE — NUR
At this time there are no vacancy at the following facilities

[facility]

[facility]- [facility]ran[facility]- [facility]

will endorsed top AM shift to continue to look for beds, Leonid charge nurse made aware.
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
## 2019-12-09 NOTE — NUR
PATIENT IS RESTING ON BED AT THIS TIME. NO SIGNS OF DISTRESS NOTE. SAFETY MEASURES IN PLACE. 
AWAITING FOR TRANSPORTATION TO ARRIVE TO TRANSFER.

## 2019-12-09 NOTE — NUR
RECEIVED BEDSIDE REPORT FROM NIGHT SHIFT NURSE FOR CONTINUITY OF CARE. PATIENT IS ASLEEP ON 
BED. AROUSABLE TO VOICE. FLACC 0. RESPIRATION EVEN AND UNLABORED ON RA. NO SIGNS OF DISTRESS 
NOTED. IV CLEAN AND INTACT, INFUSING AS PER MD ORDER. SKIN CLEAN AND DRY. SAFETY MEASURES IN 
PLACE. BED IN LOW POSITION AND1:1 SITTER BY BEDSIDE.

## 2019-12-09 NOTE — NUR
PT PULLED OUT HER IV AND SAYING "I DONT NEED IT. I AM TRANSFER SOON."  ASSESSED CANNULA 
INTACT AND NO BLEEDING AT IV SITE. ED PROVIDED TO PATIENT AND PT REFUSED IV ACCESS. PATIENT 
IS SITTING UP ON BED. DENIED SUICIDAL IDEATION AND HEARING VOICES. NO SIGNS OF DISTRESS 
NOTE. SAFETY MEASURES IN PLACE AND 1:1 SITTER BY BEDSIDE.

## 2019-12-09 NOTE — NUR
CONDITION REMAIN STABLE. SAFETY MAINTAINED DURING SHIFT. ENDORSED TO AM SHIFT NURSE FOR 
CONTINUITY OF CARE.

## 2019-12-09 NOTE — NUR
ADMINISTERED SCHEDULED MEDS AS PER MD ORDER, PATIENT IS SITTING UP ON BED. DENIED SUICIDAL 
IDEATION AND HEARING VOICES. NO SIGNS OF DISTRESS NOTE. SAFETY MEASURES IN PLACE/

## 2019-12-09 NOTE — NUR
CALLED [facility] [phone] TO FOLLOW UP . SPOKE WITH ALEJANDRO. PER ALEJANDRO, THEY NEED THE 
AUTHORIZATION # FOR . CHECKED NOTED FROM CM, AND NO # FOUND. NOTIFIED CHARGE RN AND 
WILL WORK ON IT. INFORMED ALEJANDRO THAT WE WILL CALL BACK TO FOLLOW UP.

## 2019-12-09 NOTE — NUR
Called PATRICK Mendoza at [facility]. She told me that [facility] is delegated for this stay. I gave her 
a verbal update. Requested remote team to fax over the clinicals at [phone].
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
## 2019-12-09 NOTE — NUR
DC PLANNING:

RECEIVED A CALL FROM BERNARDO  AT [facility]  ,PT IS ACCEPTED AND CAN GO TO UNIT [address]  # TO GIVE REPORT  AND 
ACCEPTING DR IS CINDY PARADA CALLED [facility]  SPOKE WITH EMIR NGUYEN FOR AUTH FOR 
TRANSPORT . PER EMIR WILL CALL BACK FOR CRISTIAN NGUYEN TO FOLLOW.

-------------------------------------------------------------------------------

Addendum: 12/09/19 at 1013 by Carolyn Leon CM

-------------------------------------------------------------------------------

DC PLANNING:

STILL WAITING FOR AUTHORIZATION , PLACED THE TRANSPORT WILL CALL WITH [facility] NGUY[facility] TO FOLLOW

-------------------------------------------------------------------------------

Addendum: 12/09/19 at 1628 by Carolyn Leon CM

-------------------------------------------------------------------------------

DC PLANNING

UN ABLE TO GET AUTHORIZATION FROM [facility] STILL WAITING FROM [facility] (EM[facility]) PER FERNANDO 
(MANAGER) ARRANGED TRANSPORT WITH [facility] , TIME WITH IN ONE HR . NOTIFIED NED GONG .

## 2019-12-12 LAB
BASOPHILS # BLD AUTO: 0 K/UL (ref 0–0.22)
BASOPHILS NFR BLD AUTO: 0.7 % (ref 0–2)
EOSINOPHIL # BLD AUTO: 0.1 K/UL (ref 0–0.4)
EOSINOPHIL NFR BLD AUTO: 1.4 % (ref 0–4)
ERYTHROCYTE [DISTWIDTH] IN BLOOD BY AUTOMATED COUNT: 15 % (ref 11.6–13.7)
HCT VFR BLD AUTO: 32.1 % (ref 36–48)
HGB BLD-MCNC: 10.6 G/DL (ref 12–16)
LYMPHOCYTES # BLD AUTO: 3.2 K/UL (ref 2.5–16.5)
LYMPHOCYTES NFR BLD AUTO: 53.9 % (ref 20.5–51.1)
MCH RBC QN AUTO: 31 PG (ref 27–31)
MCHC RBC AUTO-ENTMCNC: 33 G/DL (ref 33–37)
MCV RBC AUTO: 93.3 FL (ref 80–94)
MONOCYTES # BLD AUTO: 0.5 K/UL (ref 0.8–1)
MONOCYTES NFR BLD AUTO: 8.4 % (ref 1.7–9.3)
NEUTROPHILS # BLD AUTO: 2.1 K/UL (ref 1.8–7.7)
NEUTROPHILS NFR BLD AUTO: 35.6 % (ref 42.2–75.2)
PLATELET # BLD AUTO: 298 K/UL (ref 140–450)
RBC # BLD AUTO: 3.44 MIL/UL (ref 4.2–5.4)
WBC # BLD AUTO: 5.8 K/UL (ref 4.8–10.8)

## 2020-06-17 ENCOUNTER — HOSPITAL ENCOUNTER (EMERGENCY)
Dept: HOSPITAL 4 - SED | Age: 45
Discharge: HOME | End: 2020-06-17
Payer: MEDICAID

## 2020-06-17 VITALS — WEIGHT: 175 LBS | HEIGHT: 69 IN | BODY MASS INDEX: 25.92 KG/M2

## 2020-06-17 VITALS — SYSTOLIC BLOOD PRESSURE: 132 MMHG

## 2020-06-17 DIAGNOSIS — F41.9: ICD-10-CM

## 2020-06-17 DIAGNOSIS — N76.0: Primary | ICD-10-CM

## 2020-06-17 DIAGNOSIS — I10: ICD-10-CM

## 2020-06-17 PROCEDURE — 99283 EMERGENCY DEPT VISIT LOW MDM: CPT

## 2020-06-17 PROCEDURE — 87591 N.GONORRHOEAE DNA AMP PROB: CPT

## 2020-06-17 PROCEDURE — 87210 SMEAR WET MOUNT SALINE/INK: CPT

## 2020-06-17 PROCEDURE — 87491 CHLMYD TRACH DNA AMP PROBE: CPT

## 2020-06-17 PROCEDURE — 96372 THER/PROPH/DIAG INJ SC/IM: CPT

## 2020-06-17 NOTE — NUR
Pt came to ER for abd pain and "feeling of vaginal prolapse" sharp pain 5/10. 
Resting in gurney VSS at this time.

## 2020-06-17 NOTE — NUR
Patient given written and verbal discharge instructions and verbalizes 
understanding.  ER MD discussed with patient the results and treatment 
provided. Patient in stable condition. ID arm band removed. IV catheter removed 
intact and dressing applied, no active bleeding.

Rx of Doxycyline and Naproxen given. Patient educated on pain management and to 
follow up with PMD. Pain Scale 0/10.

Opportunity for questions provided and answered. Medication side effect fact 
sheet provided.

## 2020-06-30 ENCOUNTER — HOSPITAL ENCOUNTER (EMERGENCY)
Dept: HOSPITAL 26 - MED | Age: 45
Discharge: HOME | End: 2020-06-30
Payer: MEDICAID

## 2020-06-30 VITALS — BODY MASS INDEX: 29.16 KG/M2 | WEIGHT: 175 LBS | HEIGHT: 65 IN

## 2020-06-30 VITALS — DIASTOLIC BLOOD PRESSURE: 68 MMHG | SYSTOLIC BLOOD PRESSURE: 118 MMHG

## 2020-06-30 VITALS — SYSTOLIC BLOOD PRESSURE: 118 MMHG | DIASTOLIC BLOOD PRESSURE: 68 MMHG

## 2020-06-30 DIAGNOSIS — Z91.013: ICD-10-CM
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
## 2020-06-30 DIAGNOSIS — Z98.890: ICD-10-CM

## 2020-06-30 DIAGNOSIS — Z79.899: ICD-10-CM

## 2020-06-30 DIAGNOSIS — S02.2XXA: Primary | ICD-10-CM

## 2020-06-30 DIAGNOSIS — I10: ICD-10-CM

## 2020-06-30 DIAGNOSIS — S00.531A: ICD-10-CM
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
## 2020-06-30 DIAGNOSIS — J45.909: ICD-10-CM

## 2020-06-30 PROCEDURE — 99284 EMERGENCY DEPT VISIT MOD MDM: CPT

## 2020-06-30 PROCEDURE — 70486 CT MAXILLOFACIAL W/O DYE: CPT

## 2020-06-30 PROCEDURE — 96372 THER/PROPH/DIAG INJ SC/IM: CPT

## 2020-06-30 PROCEDURE — 81025 URINE PREGNANCY TEST: CPT
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
## 2020-06-30 NOTE — NUR
Patient discharged with v/s stable. Written and verbal after care instructions 
given and explained. 

Patient alert, oriented and verbalized understanding of instructions. 
Ambulatory with to car. All questions addressed prior to discharge. ID band 
removed. Patient advised to follow up with PMD. Rx of TYLENOL given. Patient 
educated on indication of medication including possible reaction and side 
effects. Opportunity to ask questions provided and answered.
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
## 2020-08-16 ENCOUNTER — HOSPITAL ENCOUNTER (EMERGENCY)
Dept: HOSPITAL 26 - MED | Age: 45
Discharge: HOME | End: 2020-08-16
Payer: MEDICAID

## 2020-08-16 VITALS — WEIGHT: 170 LBS | HEIGHT: 63 IN | BODY MASS INDEX: 30.12 KG/M2

## 2020-08-16 VITALS — DIASTOLIC BLOOD PRESSURE: 83 MMHG | SYSTOLIC BLOOD PRESSURE: 124 MMHG

## 2020-08-16 VITALS — SYSTOLIC BLOOD PRESSURE: 124 MMHG | DIASTOLIC BLOOD PRESSURE: 83 MMHG
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
## 2020-08-16 DIAGNOSIS — Z79.899: ICD-10-CM

## 2020-08-16 DIAGNOSIS — J45.909: ICD-10-CM

## 2020-08-16 DIAGNOSIS — S05.11XA: Primary | ICD-10-CM
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
## 2020-08-16 DIAGNOSIS — I10: ICD-10-CM

## 2020-08-16 DIAGNOSIS — Z91.013: ICD-10-CM

## 2020-08-16 PROCEDURE — 96372 THER/PROPH/DIAG INJ SC/IM: CPT

## 2020-08-16 PROCEDURE — 70486 CT MAXILLOFACIAL W/O DYE: CPT

## 2020-08-16 PROCEDURE — 99285 EMERGENCY DEPT VISIT HI MDM: CPT

## 2020-08-16 PROCEDURE — 70450 CT HEAD/BRAIN W/O DYE: CPT

## 2020-08-16 PROCEDURE — 81025 URINE PREGNANCY TEST: CPT
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
## 2020-08-16 NOTE — NUR
Patient discharged with v/s stable. Written and verbal after care instructions 
given and explained. 

Patient alert, oriented and verbalized understanding of instructions. 
Ambulatory with steady gait. All questions addressed prior to discharge. ID 
band removed. Patient advised to follow up with PMD. Rx of NAPROSYN given. 
Patient educated on indication of medication including possible reaction and 
side effects. Opportunity to ask questions provided and answered.



BUS PASS PROVIDED

## 2020-10-07 ENCOUNTER — HOSPITAL ENCOUNTER (EMERGENCY)
Dept: HOSPITAL 26 - MED | Age: 45
Discharge: HOME | End: 2020-10-07
Payer: MEDICAID

## 2020-10-07 VITALS — SYSTOLIC BLOOD PRESSURE: 108 MMHG | DIASTOLIC BLOOD PRESSURE: 74 MMHG

## 2020-10-07 VITALS — WEIGHT: 211 LBS | HEIGHT: 64 IN | BODY MASS INDEX: 36.02 KG/M2

## 2020-10-07 DIAGNOSIS — I10: ICD-10-CM

## 2020-10-07 DIAGNOSIS — F17.210: ICD-10-CM

## 2020-10-07 DIAGNOSIS — Z98.890: ICD-10-CM

## 2020-10-07 DIAGNOSIS — Z91.013: ICD-10-CM

## 2020-10-07 DIAGNOSIS — F10.239: ICD-10-CM

## 2020-10-07 DIAGNOSIS — R11.2: Primary | ICD-10-CM

## 2020-10-07 PROCEDURE — 96361 HYDRATE IV INFUSION ADD-ON: CPT

## 2020-10-07 PROCEDURE — 81002 URINALYSIS NONAUTO W/O SCOPE: CPT

## 2020-10-07 PROCEDURE — 96374 THER/PROPH/DIAG INJ IV PUSH: CPT

## 2020-10-07 PROCEDURE — 99283 EMERGENCY DEPT VISIT LOW MDM: CPT

## 2020-10-07 PROCEDURE — 81025 URINE PREGNANCY TEST: CPT

## 2020-10-07 RX ADMIN — SODIUM CHLORIDE ONE MG: 9 INJECTION, SOLUTION INTRAVENOUS at 18:04

## 2020-10-07 RX ADMIN — SODIUM CHLORIDE ONE MLS/HR: 9 INJECTION, SOLUTION INTRAVENOUS at 18:04

## 2020-10-07 NOTE — NUR
45 YEAR OLD FEMALE COMPLAINS OF NAUSEA AND VOMITTING X 4 DAYS. PT AOX4, 
BREATHING EVEN AND UNLABORED, SKIN WARM AND DRY. BED IN LOWEST POSITION, 
LOCKED, BED RAIL UPX1. 



PMH - DENIES

## 2020-11-05 ENCOUNTER — HOSPITAL ENCOUNTER (EMERGENCY)
Dept: HOSPITAL 26 - MED | Age: 45
Discharge: HOME | End: 2020-11-05
Payer: MEDICAID

## 2020-11-05 VITALS — DIASTOLIC BLOOD PRESSURE: 79 MMHG | SYSTOLIC BLOOD PRESSURE: 112 MMHG

## 2020-11-05 VITALS — BODY MASS INDEX: 30.53 KG/M2 | HEIGHT: 66 IN | WEIGHT: 190 LBS

## 2020-11-05 VITALS — DIASTOLIC BLOOD PRESSURE: 87 MMHG | SYSTOLIC BLOOD PRESSURE: 118 MMHG

## 2020-11-05 DIAGNOSIS — F10.10: ICD-10-CM

## 2020-11-05 DIAGNOSIS — K74.60: ICD-10-CM

## 2020-11-05 DIAGNOSIS — R10.30: Primary | ICD-10-CM

## 2020-11-05 LAB
ALBUMIN FLD-MCNC: 4 G/DL (ref 3.4–5)
AMYLASE SERPL-CCNC: 46 U/L (ref 25–115)
ANION GAP SERPL CALCULATED.3IONS-SCNC: 16.4 MMOL/L (ref 8–16)
AST SERPL-CCNC: 67 U/L (ref 15–37)
BASOPHILS # BLD AUTO: 0.1 K/UL (ref 0–0.22)
BASOPHILS NFR BLD AUTO: 1.2 % (ref 0–2)
BILIRUB SERPL-MCNC: 0.3 MG/DL (ref 0–1)
BUN SERPL-MCNC: 8 MG/DL (ref 7–18)
CHLORIDE SERPL-SCNC: 104 MMOL/L (ref 98–107)
CO2 SERPL-SCNC: 25.7 MMOL/L (ref 21–32)
CREAT SERPL-MCNC: 0.6 MG/DL (ref 0.6–1.3)
EOSINOPHIL # BLD AUTO: 0.1 K/UL (ref 0–0.4)
EOSINOPHIL NFR BLD AUTO: 1.3 % (ref 0–4)
ERYTHROCYTE [DISTWIDTH] IN BLOOD BY AUTOMATED COUNT: 14.4 % (ref 11.6–13.7)
GFR SERPL CREATININE-BSD FRML MDRD: 138 ML/MIN (ref 90–?)
GLUCOSE SERPL-MCNC: 114 MG/DL (ref 74–106)
HCT VFR BLD AUTO: 40.2 % (ref 36–48)
HGB BLD-MCNC: 13.6 G/DL (ref 12–16)
LIPASE SERPL-CCNC: 105 U/L (ref 73–393)
LYMPHOCYTES # BLD AUTO: 3.2 K/UL (ref 2.5–16.5)
LYMPHOCYTES NFR BLD AUTO: 43.9 % (ref 20.5–51.1)
MCH RBC QN AUTO: 33 PG (ref 27–31)
MCHC RBC AUTO-ENTMCNC: 34 G/DL (ref 33–37)
MCV RBC AUTO: 96.1 FL (ref 80–94)
MONOCYTES # BLD AUTO: 0.5 K/UL (ref 0.8–1)
MONOCYTES NFR BLD AUTO: 6.9 % (ref 1.7–9.3)
NEUTROPHILS # BLD AUTO: 3.4 K/UL (ref 1.8–7.7)
NEUTROPHILS NFR BLD AUTO: 46.7 % (ref 42.2–75.2)
PLATELET # BLD AUTO: 307 K/UL (ref 140–450)
POTASSIUM SERPL-SCNC: 3.1 MMOL/L (ref 3.5–5.1)
RBC # BLD AUTO: 4.18 MIL/UL (ref 4.2–5.4)
SODIUM SERPL-SCNC: 143 MMOL/L (ref 136–145)
WBC # BLD AUTO: 7.3 K/UL (ref 4.8–10.8)

## 2020-11-05 PROCEDURE — 85025 COMPLETE CBC W/AUTO DIFF WBC: CPT

## 2020-11-05 PROCEDURE — 82150 ASSAY OF AMYLASE: CPT

## 2020-11-05 PROCEDURE — 36415 COLL VENOUS BLD VENIPUNCTURE: CPT

## 2020-11-05 PROCEDURE — 99283 EMERGENCY DEPT VISIT LOW MDM: CPT

## 2020-11-05 PROCEDURE — 83690 ASSAY OF LIPASE: CPT

## 2020-11-05 PROCEDURE — 84703 CHORIONIC GONADOTROPIN ASSAY: CPT

## 2020-11-05 PROCEDURE — 80053 COMPREHEN METABOLIC PANEL: CPT

## 2020-11-05 PROCEDURE — 96372 THER/PROPH/DIAG INJ SC/IM: CPT
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
## 2020-12-27 ENCOUNTER — HOSPITAL ENCOUNTER (EMERGENCY)
Dept: HOSPITAL 26 - MED | Age: 45
LOS: 1 days | Discharge: HOME | End: 2020-12-28
Payer: MEDICAID

## 2020-12-27 VITALS — BODY MASS INDEX: 28.12 KG/M2 | WEIGHT: 175 LBS | HEIGHT: 66 IN

## 2020-12-27 VITALS — SYSTOLIC BLOOD PRESSURE: 130 MMHG | DIASTOLIC BLOOD PRESSURE: 75 MMHG

## 2020-12-27 DIAGNOSIS — M54.5: Primary | ICD-10-CM

## 2020-12-27 PROCEDURE — 99283 EMERGENCY DEPT VISIT LOW MDM: CPT

## 2020-12-27 PROCEDURE — 96372 THER/PROPH/DIAG INJ SC/IM: CPT

## 2020-12-28 VITALS — DIASTOLIC BLOOD PRESSURE: 75 MMHG | SYSTOLIC BLOOD PRESSURE: 130 MMHG

## 2021-02-20 ENCOUNTER — HOSPITAL ENCOUNTER (EMERGENCY)
Dept: HOSPITAL 26 - MED | Age: 46
Discharge: HOME | End: 2021-02-20
Payer: MEDICAID

## 2021-02-20 VITALS — SYSTOLIC BLOOD PRESSURE: 152 MMHG | DIASTOLIC BLOOD PRESSURE: 87 MMHG

## 2021-02-20 VITALS — WEIGHT: 200 LBS | HEIGHT: 66 IN | BODY MASS INDEX: 32.14 KG/M2

## 2021-02-20 VITALS — DIASTOLIC BLOOD PRESSURE: 87 MMHG | SYSTOLIC BLOOD PRESSURE: 152 MMHG

## 2021-02-20 DIAGNOSIS — I10: ICD-10-CM

## 2021-02-20 DIAGNOSIS — Z91.013: ICD-10-CM

## 2021-02-20 DIAGNOSIS — M54.41: Primary | ICD-10-CM

## 2021-02-20 DIAGNOSIS — Z79.899: ICD-10-CM

## 2021-02-20 PROCEDURE — 81002 URINALYSIS NONAUTO W/O SCOPE: CPT

## 2021-02-20 PROCEDURE — 96372 THER/PROPH/DIAG INJ SC/IM: CPT

## 2021-02-20 PROCEDURE — 99283 EMERGENCY DEPT VISIT LOW MDM: CPT
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
## 2021-03-13 ENCOUNTER — HOSPITAL ENCOUNTER (EMERGENCY)
Dept: HOSPITAL 26 - MED | Age: 46
Discharge: ANOTHER HOSPITAL | End: 2021-03-13
Payer: MEDICAID

## 2021-03-13 VITALS — BODY MASS INDEX: 32.14 KG/M2 | WEIGHT: 200 LBS | HEIGHT: 66 IN

## 2021-03-13 VITALS — SYSTOLIC BLOOD PRESSURE: 136 MMHG | DIASTOLIC BLOOD PRESSURE: 61 MMHG

## 2021-03-13 VITALS — DIASTOLIC BLOOD PRESSURE: 85 MMHG | SYSTOLIC BLOOD PRESSURE: 141 MMHG
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
## 2021-03-13 DIAGNOSIS — S01.112A: ICD-10-CM

## 2021-03-13 DIAGNOSIS — Z91.013: ICD-10-CM

## 2021-03-13 DIAGNOSIS — Z20.822: ICD-10-CM

## 2021-03-13 DIAGNOSIS — S02.32XA: Primary | ICD-10-CM
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
## 2021-03-13 DIAGNOSIS — I10: ICD-10-CM

## 2021-03-13 LAB
ANION GAP SERPL CALCULATED.3IONS-SCNC: 13.7 MMOL/L (ref 8–16)
BASOPHILS # BLD AUTO: 0.1 K/UL (ref 0–0.22)
BASOPHILS NFR BLD AUTO: 0.8 % (ref 0–2)
BUN SERPL-MCNC: 13 MG/DL (ref 7–18)
CHLORIDE SERPL-SCNC: 104 MMOL/L (ref 98–107)
CO2 SERPL-SCNC: 25.6 MMOL/L (ref 21–32)
CREAT SERPL-MCNC: 0.7 MG/DL (ref 0.6–1.3)
EOSINOPHIL # BLD AUTO: 0.1 K/UL (ref 0–0.4)
EOSINOPHIL NFR BLD AUTO: 1.3 % (ref 0–4)
ERYTHROCYTE [DISTWIDTH] IN BLOOD BY AUTOMATED COUNT: 17.4 % (ref 11.6–13.7)
GFR SERPL CREATININE-BSD FRML MDRD: 116 ML/MIN (ref 90–?)
GLUCOSE SERPL-MCNC: 103 MG/DL (ref 74–106)
HCT VFR BLD AUTO: 37.1 % (ref 36–48)
HGB BLD-MCNC: 12.5 G/DL (ref 12–16)
LYMPHOCYTES # BLD AUTO: 2.1 K/UL (ref 2.5–16.5)
LYMPHOCYTES NFR BLD AUTO: 24.1 % (ref 20.5–51.1)
MCH RBC QN AUTO: 31 PG (ref 27–31)
MCHC RBC AUTO-ENTMCNC: 34 G/DL (ref 33–37)
MCV RBC AUTO: 93.1 FL (ref 80–94)
MONOCYTES # BLD AUTO: 0.6 K/UL (ref 0.8–1)
MONOCYTES NFR BLD AUTO: 6.6 % (ref 1.7–9.3)
NEUTROPHILS # BLD AUTO: 6 K/UL (ref 1.8–7.7)
NEUTROPHILS NFR BLD AUTO: 67.2 % (ref 42.2–75.2)
PLATELET # BLD AUTO: 254 K/UL (ref 140–450)
POTASSIUM SERPL-SCNC: 3.3 MMOL/L (ref 3.5–5.1)
PROTHROMBIN TIME: 9.8 SECS (ref 10.8–13.4)
RBC # BLD AUTO: 3.99 MIL/UL (ref 4.2–5.4)
SODIUM SERPL-SCNC: 140 MMOL/L (ref 136–145)
WBC # BLD AUTO: 8.9 K/UL (ref 4.8–10.8)

## 2021-03-13 PROCEDURE — 99285 EMERGENCY DEPT VISIT HI MDM: CPT

## 2021-03-13 PROCEDURE — G0482 DRUG TEST DEF 15-21 CLASSES: HCPCS

## 2021-03-13 PROCEDURE — 85730 THROMBOPLASTIN TIME PARTIAL: CPT

## 2021-03-13 PROCEDURE — 85025 COMPLETE CBC W/AUTO DIFF WBC: CPT

## 2021-03-13 PROCEDURE — 36415 COLL VENOUS BLD VENIPUNCTURE: CPT

## 2021-03-13 PROCEDURE — 86901 BLOOD TYPING SEROLOGIC RH(D): CPT

## 2021-03-13 PROCEDURE — 70486 CT MAXILLOFACIAL W/O DYE: CPT

## 2021-03-13 PROCEDURE — 85610 PROTHROMBIN TIME: CPT

## 2021-03-13 PROCEDURE — 71045 X-RAY EXAM CHEST 1 VIEW: CPT

## 2021-03-13 PROCEDURE — 86886 COOMBS TEST INDIRECT TITER: CPT

## 2021-03-13 PROCEDURE — 80048 BASIC METABOLIC PNL TOTAL CA: CPT

## 2021-03-13 PROCEDURE — 86900 BLOOD TYPING SEROLOGIC ABO: CPT

## 2021-03-13 PROCEDURE — 87426 SARSCOV CORONAVIRUS AG IA: CPT

## 2021-03-13 PROCEDURE — 90471 IMMUNIZATION ADMIN: CPT

## 2021-03-13 PROCEDURE — 70450 CT HEAD/BRAIN W/O DYE: CPT

## 2021-03-13 PROCEDURE — 90715 TDAP VACCINE 7 YRS/> IM: CPT
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
## 2021-03-13 NOTE — NUR
Patient to be transferred to [facility].  Is being transferred due to HIGHER 
LEVEL OF CARE.  Receiving facility has accepting physician and available space. 
ER physician has signed transfer form.  Patient or responsible party has agreed 
to transfer and signed form.  Patient belongings inventoried and will be sent 
with patient.  Copy of nursing notes, lab reports, EKG, Physicians Orders and 
X-rays to be sent with patient.  Report called to ASHUTOSH GONG at receiving 
facility.  [facility] ambulance service has been called for transfer.  [facility] present at 
bedside.

## 2021-06-10 ENCOUNTER — HOSPITAL ENCOUNTER (EMERGENCY)
Dept: HOSPITAL 26 - MED | Age: 46
Discharge: HOME | End: 2021-06-10
Payer: MEDICAID

## 2021-06-10 VITALS — HEIGHT: 65 IN | BODY MASS INDEX: 29.99 KG/M2 | WEIGHT: 180 LBS

## 2021-06-10 VITALS — SYSTOLIC BLOOD PRESSURE: 137 MMHG | DIASTOLIC BLOOD PRESSURE: 104 MMHG

## 2021-06-10 VITALS — DIASTOLIC BLOOD PRESSURE: 104 MMHG | SYSTOLIC BLOOD PRESSURE: 137 MMHG

## 2021-06-10 DIAGNOSIS — Z91.013: ICD-10-CM

## 2021-06-10 DIAGNOSIS — I10: ICD-10-CM

## 2021-06-10 DIAGNOSIS — J02.9: Primary | ICD-10-CM

## 2021-06-10 DIAGNOSIS — Z20.822: ICD-10-CM

## 2021-06-10 PROCEDURE — 99284 EMERGENCY DEPT VISIT MOD MDM: CPT

## 2021-06-10 PROCEDURE — 72100 X-RAY EXAM L-S SPINE 2/3 VWS: CPT

## 2021-06-10 PROCEDURE — U0003 INFECTIOUS AGENT DETECTION BY NUCLEIC ACID (DNA OR RNA); SEVERE ACUTE RESPIRATORY SYNDROME CORONAVIRUS 2 (SARS-COV-2) (CORONAVIRUS DISEASE [COVID-19]), AMPLIFIED PROBE TECHNIQUE, MAKING USE OF HIGH THROUGHPUT TECHNOLOGIES AS DESCRIBED BY CMS-2020-01-R: HCPCS

## 2021-06-10 PROCEDURE — 71045 X-RAY EXAM CHEST 1 VIEW: CPT

## 2021-06-10 PROCEDURE — 96372 THER/PROPH/DIAG INJ SC/IM: CPT

## 2021-06-10 PROCEDURE — 81025 URINE PREGNANCY TEST: CPT

## 2021-06-10 NOTE — NUR
46 Y/O FEMALE C/O COUGH, SORE THROAT, FEVER, HEADACHE ,GENERALIZED WEAKNESS , 
MUSCLE SPASM DIZZINESS X 3 DAYS. PT STATES 10/10 SHARP PAIN TO RT SIDE. WAS 
SEEN TODAY BY PCP AND GIVEN PROMETHAZINE, BUT HAS NOT TAKEN TODAY. 



PMH: HTN, BIPOLAR, 5150 DENIES SI.

ALLERGIES: SHELLFISH

## 2021-07-14 ENCOUNTER — HOSPITAL ENCOUNTER (EMERGENCY)
Dept: HOSPITAL 26 - MED | Age: 46
LOS: 1 days | Discharge: HOME | End: 2021-07-15
Payer: MEDICAID

## 2021-07-14 VITALS — DIASTOLIC BLOOD PRESSURE: 79 MMHG | SYSTOLIC BLOOD PRESSURE: 133 MMHG

## 2021-07-14 VITALS — HEIGHT: 62 IN | WEIGHT: 213 LBS | BODY MASS INDEX: 39.2 KG/M2

## 2021-07-14 DIAGNOSIS — N64.4: ICD-10-CM

## 2021-07-14 DIAGNOSIS — M54.31: Primary | ICD-10-CM

## 2021-07-14 DIAGNOSIS — Z91.013: ICD-10-CM

## 2021-07-14 DIAGNOSIS — Z79.899: ICD-10-CM

## 2021-07-14 DIAGNOSIS — I10: ICD-10-CM

## 2021-07-14 PROCEDURE — 99283 EMERGENCY DEPT VISIT LOW MDM: CPT

## 2021-07-14 PROCEDURE — 96372 THER/PROPH/DIAG INJ SC/IM: CPT
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
## 2021-07-26 ENCOUNTER — HOSPITAL ENCOUNTER (EMERGENCY)
Dept: HOSPITAL 26 - MED | Age: 46
LOS: 1 days | Discharge: HOME | End: 2021-07-27
Payer: MEDICAID

## 2021-07-26 VITALS
WEIGHT: 180 LBS | BODY MASS INDEX: 31.89 KG/M2 | DIASTOLIC BLOOD PRESSURE: 53 MMHG | HEIGHT: 63 IN | SYSTOLIC BLOOD PRESSURE: 107 MMHG

## 2021-07-26 DIAGNOSIS — R45.851: ICD-10-CM

## 2021-07-26 DIAGNOSIS — F10.129: Primary | ICD-10-CM

## 2021-07-26 DIAGNOSIS — I10: ICD-10-CM

## 2021-07-26 DIAGNOSIS — Z79.899: ICD-10-CM

## 2021-07-26 DIAGNOSIS — Z85.3: ICD-10-CM

## 2021-07-26 DIAGNOSIS — Z20.822: ICD-10-CM

## 2021-07-26 DIAGNOSIS — F20.9: ICD-10-CM

## 2021-07-26 DIAGNOSIS — Z91.013: ICD-10-CM

## 2021-07-26 LAB
ALBUMIN FLD-MCNC: 3.6 G/DL (ref 3.4–5)
ANION GAP SERPL CALCULATED.3IONS-SCNC: 16.1 MMOL/L (ref 8–16)
APAP SERPL-MCNC: < 0.5 UG/ML (ref 10–30)
AST SERPL-CCNC: 143 U/L (ref 15–37)
BARBITURATES UR QL SCN: NEGATIVE NG/ML
BASOPHILS # BLD AUTO: 0.1 K/UL (ref 0–0.22)
BASOPHILS NFR BLD AUTO: 1 % (ref 0–2)
BENZODIAZ UR QL SCN: NEGATIVE NG/ML
BILIRUB SERPL-MCNC: 0.2 MG/DL (ref 0–1)
BUN SERPL-MCNC: 13 MG/DL (ref 7–18)
BZE UR QL SCN: NEGATIVE NG/ML
CANNABINOIDS UR QL SCN: NEGATIVE NG/ML
CHLORIDE SERPL-SCNC: 104 MMOL/L (ref 98–107)
CO2 SERPL-SCNC: 24 MMOL/L (ref 21–32)
CREAT SERPL-MCNC: 0.8 MG/DL (ref 0.6–1.3)
EOSINOPHIL # BLD AUTO: 0.2 K/UL (ref 0–0.4)
EOSINOPHIL NFR BLD AUTO: 2.6 % (ref 0–4)
ERYTHROCYTE [DISTWIDTH] IN BLOOD BY AUTOMATED COUNT: 18.9 % (ref 11.6–13.7)
GFR SERPL CREATININE-BSD FRML MDRD: 99 ML/MIN (ref 90–?)
GLUCOSE SERPL-MCNC: 136 MG/DL (ref 74–106)
HCT VFR BLD AUTO: 38.2 % (ref 36–48)
HGB BLD-MCNC: 12.8 G/DL (ref 12–16)
LYMPHOCYTES # BLD AUTO: 2.9 K/UL (ref 2.5–16.5)
LYMPHOCYTES NFR BLD AUTO: 36.4 % (ref 20.5–51.1)
MCH RBC QN AUTO: 30 PG (ref 27–31)
MCHC RBC AUTO-ENTMCNC: 33 G/DL (ref 33–37)
MCV RBC AUTO: 90.6 FL (ref 80–94)
MONOCYTES # BLD AUTO: 0.5 K/UL (ref 0.8–1)
MONOCYTES NFR BLD AUTO: 6.6 % (ref 1.7–9.3)
NEUTROPHILS # BLD AUTO: 4.3 K/UL (ref 1.8–7.7)
NEUTROPHILS NFR BLD AUTO: 53.4 % (ref 42.2–75.2)
OPIATES UR QL SCN: NEGATIVE NG/ML
PCP UR QL SCN: NEGATIVE NG/ML
PLATELET # BLD AUTO: 354 K/UL (ref 140–450)
POTASSIUM SERPL-SCNC: 3.1 MMOL/L (ref 3.5–5.1)
RBC # BLD AUTO: 4.22 MIL/UL (ref 4.2–5.4)
SALICYLATES SERPL-MCNC: < 2.8 MG/DL (ref 2.8–20)
SODIUM SERPL-SCNC: 141 MMOL/L (ref 136–145)
WBC # BLD AUTO: 8 K/UL (ref 4.8–10.8)

## 2021-07-26 PROCEDURE — 80053 COMPREHEN METABOLIC PANEL: CPT

## 2021-07-26 PROCEDURE — 93005 ELECTROCARDIOGRAM TRACING: CPT

## 2021-07-26 PROCEDURE — 87426 SARSCOV CORONAVIRUS AG IA: CPT

## 2021-07-26 PROCEDURE — G0480 DRUG TEST DEF 1-7 CLASSES: HCPCS

## 2021-07-26 PROCEDURE — 96361 HYDRATE IV INFUSION ADD-ON: CPT

## 2021-07-26 PROCEDURE — 96374 THER/PROPH/DIAG INJ IV PUSH: CPT

## 2021-07-26 PROCEDURE — 36415 COLL VENOUS BLD VENIPUNCTURE: CPT

## 2021-07-26 PROCEDURE — 85025 COMPLETE CBC W/AUTO DIFF WBC: CPT

## 2021-07-26 PROCEDURE — U0003 INFECTIOUS AGENT DETECTION BY NUCLEIC ACID (DNA OR RNA); SEVERE ACUTE RESPIRATORY SYNDROME CORONAVIRUS 2 (SARS-COV-2) (CORONAVIRUS DISEASE [COVID-19]), AMPLIFIED PROBE TECHNIQUE, MAKING USE OF HIGH THROUGHPUT TECHNOLOGIES AS DESCRIBED BY CMS-2020-01-R: HCPCS

## 2021-07-26 PROCEDURE — G0482 DRUG TEST DEF 15-21 CLASSES: HCPCS

## 2021-07-26 PROCEDURE — 81025 URINE PREGNANCY TEST: CPT

## 2021-07-26 PROCEDURE — 96375 TX/PRO/DX INJ NEW DRUG ADDON: CPT

## 2021-07-26 PROCEDURE — 96372 THER/PROPH/DIAG INJ SC/IM: CPT

## 2021-07-26 PROCEDURE — 81002 URINALYSIS NONAUTO W/O SCOPE: CPT

## 2021-07-26 PROCEDURE — 99285 EMERGENCY DEPT VISIT HI MDM: CPT

## 2021-07-26 PROCEDURE — 80305 DRUG TEST PRSMV DIR OPT OBS: CPT
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
## 2021-07-26 NOTE — NUR
-------------------------------------------------------------------------------

            *** Note undone in [facility] - 07/26/21 at 1743 by LASHAY ***             

-------------------------------------------------------------------------------

George sample, covid louise & paul, blood sample, handed to CPT Ca.

## 2021-07-27 VITALS — DIASTOLIC BLOOD PRESSURE: 96 MMHG | SYSTOLIC BLOOD PRESSURE: 145 MMHG

## 2021-07-27 NOTE — NUR
Patient presents both eyes closed laying on left side in semi-fowlers. Cardiac 
monitor in place. Bed locked in lowest position, side rails x 2. VSS; 
respirations even/unlabored.

## 2021-07-27 NOTE — NUR
Patient awake on mobile phone laying on Right side in semi-fowlres position. 
Cardiac monitor in place. Bed locked in lowest position, side rails x 1.

## 2021-07-27 NOTE — NUR
Patient awake sitting in high-fowlers completing meal at this time. Cardiac 
monitor remains  in place. VSS. Bed locked in lowest position, side rails x 2 
for pt safety.

## 2021-07-27 NOTE — NUR
Brennen Mai called and provided with status update on patient; line transferred 
to mobile phone currently on phone with patient at this time.

## 2021-07-27 NOTE — NUR
Patient discharged with v/s stable. Written and verbal after care instructions 
given and explained. 

Patient alert, oriented and verbalized understanding of instructions. 
Ambulatory with steady gait. All questions addressed prior to discharge. ID 
band removed. Patient advised to follow up with PMD. Rx of ABILIFY, GABAPENTIN 
given. Patient educated on indication of medication including possible reaction 
and side effects. Opportunity to ask questions provided and answered.

## 2021-07-27 NOTE — NUR
PT GIVEN BREAKFAST TRAY. PT EATING QUIETLY IN BED. PT STATING HER BREAST HURTS 
AND IS REQUESTING PAIN MEDS. DR WALDROP MADE AWARE

## 2021-07-27 NOTE — NUR
Patient presents in semi-fowlers position in position of comfort. Cardiac 
monitor in place. Bed locked in lowest position, side rails x 2.

## 2021-08-03 ENCOUNTER — HOSPITAL ENCOUNTER (EMERGENCY)
Dept: HOSPITAL 26 - MED | Age: 46
Discharge: HOME | End: 2021-08-03
Payer: MEDICAID

## 2021-08-03 VITALS — BODY MASS INDEX: 30.53 KG/M2 | WEIGHT: 190 LBS | HEIGHT: 66 IN

## 2021-08-03 VITALS — SYSTOLIC BLOOD PRESSURE: 134 MMHG | DIASTOLIC BLOOD PRESSURE: 70 MMHG

## 2021-08-03 DIAGNOSIS — I10: ICD-10-CM

## 2021-08-03 DIAGNOSIS — Z85.3: ICD-10-CM
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
## 2021-08-03 DIAGNOSIS — Z79.899: ICD-10-CM

## 2021-08-03 DIAGNOSIS — M54.31: ICD-10-CM
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
## 2021-08-03 DIAGNOSIS — Z91.013: ICD-10-CM

## 2021-08-03 DIAGNOSIS — S76.011A: Primary | ICD-10-CM
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
## 2021-08-03 PROCEDURE — 99284 EMERGENCY DEPT VISIT MOD MDM: CPT

## 2021-08-03 PROCEDURE — 72100 X-RAY EXAM L-S SPINE 2/3 VWS: CPT

## 2021-08-03 PROCEDURE — 73502 X-RAY EXAM HIP UNI 2-3 VIEWS: CPT

## 2021-08-03 PROCEDURE — 96372 THER/PROPH/DIAG INJ SC/IM: CPT

## 2021-08-03 RX ADMIN — KETOROLAC TROMETHAMINE ONE MG: 30 INJECTION, SOLUTION INTRAMUSCULAR; INTRAVENOUS at 14:50
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
## 2021-08-03 NOTE — NUR
Patient discharged. Written and verbal after care instructions given and 
explained. 

Patient alert, oriented and verbalized understanding of instructions. 
Ambulatory with steady gait. All questions addressed prior to discharge. ID 
band removed. Patient advised to follow up with PMD. Rx of FLEXERIL, LIDODERM 
PATCH, PREDNISONE AND IBUPROFEN  given. Patient educated on indication of 
medication including possible reaction and side effects. Opportunity to ask 
questions provided and answered.

## 2021-09-30 ENCOUNTER — HOSPITAL ENCOUNTER (EMERGENCY)
Dept: HOSPITAL 26 - MED | Age: 46
Discharge: HOME | End: 2021-09-30
Payer: MEDICAID

## 2021-09-30 VITALS — DIASTOLIC BLOOD PRESSURE: 68 MMHG | SYSTOLIC BLOOD PRESSURE: 129 MMHG

## 2021-09-30 VITALS — BODY MASS INDEX: 32.14 KG/M2 | WEIGHT: 200 LBS | HEIGHT: 66 IN

## 2021-09-30 VITALS — SYSTOLIC BLOOD PRESSURE: 129 MMHG | DIASTOLIC BLOOD PRESSURE: 68 MMHG

## 2021-09-30 DIAGNOSIS — I10: ICD-10-CM

## 2021-09-30 DIAGNOSIS — N64.4: ICD-10-CM

## 2021-09-30 DIAGNOSIS — Z79.899: ICD-10-CM

## 2021-09-30 DIAGNOSIS — Z85.3: ICD-10-CM

## 2021-09-30 DIAGNOSIS — G89.29: Primary | ICD-10-CM

## 2021-09-30 DIAGNOSIS — M54.40: ICD-10-CM

## 2021-09-30 DIAGNOSIS — Z91.013: ICD-10-CM

## 2021-09-30 PROCEDURE — 96372 THER/PROPH/DIAG INJ SC/IM: CPT

## 2021-09-30 PROCEDURE — 99283 EMERGENCY DEPT VISIT LOW MDM: CPT

## 2021-09-30 PROCEDURE — 81025 URINE PREGNANCY TEST: CPT

## 2021-09-30 PROCEDURE — 81002 URINALYSIS NONAUTO W/O SCOPE: CPT

## 2021-09-30 NOTE — NUR
Patient discharged with v/s stable. Written and verbal after care instructions 
given and explained. 

Patient alert, oriented and verbalized understanding of instructions. 
Ambulatory with steady gait. All questions addressed prior to discharge. ID 
band removed. Patient advised to follow up with PMD. Rx of LIDOCAINE, MOTRIN 
given. Patient educated on indication of medication including possible reaction 
and side effects. Opportunity to ask questions provided and answered.

## 2021-09-30 NOTE — NUR
45 Y/O F BIB SELF FROM HOME, C/O L BREAST PAIN, PT STATES SHE HAD 2CM OF 
MALIGNANT MASS IN L BREAST REMOVED ON AUGUST 13TH, PT STATES SINCE SURGERY, SHE 
HAS BEEN HAVING INCREASED PAIN IN AREA AND WOULD LIKE SECOND OPINION FROM MD. 
STATES SHE HAS RADIATION THERAPY ON 10/07/21. DENIES N/V/D; SKIN IS 
PINK/WARM/DRY; AAOX4 WITH EVEN AND STEADY GAIT; LUNGS CLEAR BL; HR EVEN AND 
REGULAR; PT DENIES ANY FEVER, CP, SOB, OR COUGH AT THIS TIME; PATIENT STATES 
PAIN OF 10/10 AT THIS TIME; VSS; PATIENT POSITIONED FOR COMFORT; HOB ELEVATED; 
BEDRAILS UP X2; BED DOWN. ER MD MADE AWARE OF PT STATUS.



PMH: BREAST CANCER, HTN

ALLERG: SHELLFISH

## 2022-03-07 ENCOUNTER — HOSPITAL ENCOUNTER (EMERGENCY)
Dept: HOSPITAL 26 - MED | Age: 47
LOS: 1 days | Discharge: HOME | End: 2022-03-08
Payer: MEDICAID

## 2022-03-07 VITALS — DIASTOLIC BLOOD PRESSURE: 88 MMHG | SYSTOLIC BLOOD PRESSURE: 125 MMHG

## 2022-03-07 VITALS — WEIGHT: 203 LBS | BODY MASS INDEX: 32.62 KG/M2 | HEIGHT: 66 IN

## 2022-03-07 DIAGNOSIS — I10: ICD-10-CM

## 2022-03-07 DIAGNOSIS — Z91.013: ICD-10-CM

## 2022-03-07 DIAGNOSIS — Z85.3: ICD-10-CM

## 2022-03-07 DIAGNOSIS — F41.9: Primary | ICD-10-CM

## 2022-03-07 DIAGNOSIS — R06.02: ICD-10-CM

## 2022-03-07 DIAGNOSIS — F32.9: ICD-10-CM

## 2022-03-07 PROCEDURE — 85025 COMPLETE CBC W/AUTO DIFF WBC: CPT

## 2022-03-07 PROCEDURE — 84484 ASSAY OF TROPONIN QUANT: CPT

## 2022-03-07 PROCEDURE — 85379 FIBRIN DEGRADATION QUANT: CPT

## 2022-03-07 PROCEDURE — 80053 COMPREHEN METABOLIC PANEL: CPT

## 2022-03-07 PROCEDURE — 99285 EMERGENCY DEPT VISIT HI MDM: CPT

## 2022-03-07 PROCEDURE — 93005 ELECTROCARDIOGRAM TRACING: CPT

## 2022-03-07 PROCEDURE — 71045 X-RAY EXAM CHEST 1 VIEW: CPT

## 2022-03-07 PROCEDURE — 83880 ASSAY OF NATRIURETIC PEPTIDE: CPT

## 2022-03-07 PROCEDURE — 36415 COLL VENOUS BLD VENIPUNCTURE: CPT

## 2022-03-08 VITALS — DIASTOLIC BLOOD PRESSURE: 79 MMHG | SYSTOLIC BLOOD PRESSURE: 134 MMHG

## 2022-03-08 LAB
ALBUMIN FLD-MCNC: 3.3 G/DL (ref 3.4–5)
ANION GAP SERPL CALCULATED.3IONS-SCNC: 12.4 MMOL/L (ref 8–16)
AST SERPL-CCNC: 100 U/L (ref 15–37)
BASOPHILS # BLD AUTO: 0.1 K/UL (ref 0–0.22)
BASOPHILS NFR BLD AUTO: 0.7 % (ref 0–2)
BILIRUB SERPL-MCNC: 0.8 MG/DL (ref 0–1)
BUN SERPL-MCNC: 18 MG/DL (ref 7–18)
CHLORIDE SERPL-SCNC: 103 MMOL/L (ref 98–107)
CO2 SERPL-SCNC: 24.5 MMOL/L (ref 21–32)
CREAT SERPL-MCNC: 1 MG/DL (ref 0.6–1.3)
EOSINOPHIL # BLD AUTO: 0.2 K/UL (ref 0–0.4)
EOSINOPHIL NFR BLD AUTO: 1.8 % (ref 0–4)
ERYTHROCYTE [DISTWIDTH] IN BLOOD BY AUTOMATED COUNT: 14.6 % (ref 11.6–13.7)
GFR SERPL CREATININE-BSD FRML MDRD: 76 ML/MIN (ref 90–?)
GLUCOSE SERPL-MCNC: 128 MG/DL (ref 74–106)
HCT VFR BLD AUTO: 32.9 % (ref 36–48)
HGB BLD-MCNC: 11.4 G/DL (ref 12–16)
LYMPHOCYTES # BLD AUTO: 1.9 K/UL (ref 2.5–16.5)
LYMPHOCYTES NFR BLD AUTO: 16.6 % (ref 20.5–51.1)
MCH RBC QN AUTO: 35 PG (ref 27–31)
MCHC RBC AUTO-ENTMCNC: 35 G/DL (ref 33–37)
MCV RBC AUTO: 102.3 FL (ref 80–94)
MONOCYTES # BLD AUTO: 0.9 K/UL (ref 0.8–1)
MONOCYTES NFR BLD AUTO: 7.9 % (ref 1.7–9.3)
NEUTROPHILS # BLD AUTO: 8.4 K/UL (ref 1.8–7.7)
NEUTROPHILS NFR BLD AUTO: 73 % (ref 42.2–75.2)
PLATELET # BLD AUTO: 331 K/UL (ref 140–450)
POTASSIUM SERPL-SCNC: 2.9 MMOL/L (ref 3.5–5.1)
RBC # BLD AUTO: 3.22 MIL/UL (ref 4.2–5.4)
SODIUM SERPL-SCNC: 137 MMOL/L (ref 136–145)
WBC # BLD AUTO: 11.5 K/UL (ref 4.8–10.8)

## 2022-03-08 NOTE — NUR
PT BIB  FOR C/O OF SHORTNESS OF BREATH X1WEEK AND BACK PAIN. PATIENT 
ABLE TO AMBULATE TO BED AND CONNECTED TO MONITORS.



PMH: HTN, SCHIZOPHRENIA, DEPRESSION, ADHD, L5 NERVE DAMAGE X 22 YEARS

ALLERGY: SHELLFISH

-------------------------------------------------------------------------------

Addendum: 03/08/22 at 0213 by JUNIOR

-------------------------------------------------------------------------------

PMH: BREAST CANCER X1YEAR, RADIATION THERAPY

SURGICAL HISTORY: BREAST CANCER REMOVAL AUGUST 2021

MEDICATIONS CURRENTLY TAKING: TAMOXIFEN

## 2022-04-01 ENCOUNTER — HOSPITAL ENCOUNTER (EMERGENCY)
Dept: HOSPITAL 26 - MED | Age: 47
LOS: 1 days | Discharge: HOME | End: 2022-04-02
Payer: MEDICAID

## 2022-04-01 VITALS — WEIGHT: 180 LBS | HEIGHT: 66 IN | BODY MASS INDEX: 28.93 KG/M2

## 2022-04-01 VITALS — DIASTOLIC BLOOD PRESSURE: 75 MMHG | SYSTOLIC BLOOD PRESSURE: 125 MMHG

## 2022-04-01 DIAGNOSIS — Z91.013: ICD-10-CM

## 2022-04-01 DIAGNOSIS — Z85.3: ICD-10-CM

## 2022-04-01 DIAGNOSIS — F20.9: ICD-10-CM

## 2022-04-01 DIAGNOSIS — I10: ICD-10-CM

## 2022-04-01 DIAGNOSIS — F32.9: ICD-10-CM

## 2022-04-01 DIAGNOSIS — Z79.899: ICD-10-CM

## 2022-04-01 DIAGNOSIS — H57.10: Primary | ICD-10-CM

## 2022-04-01 DIAGNOSIS — F90.9: ICD-10-CM

## 2022-04-02 VITALS — SYSTOLIC BLOOD PRESSURE: 120 MMHG | DIASTOLIC BLOOD PRESSURE: 76 MMHG

## 2022-05-25 ENCOUNTER — HOSPITAL ENCOUNTER (EMERGENCY)
Dept: HOSPITAL 26 - MED | Age: 47
Discharge: HOME | End: 2022-05-25
Payer: MEDICAID

## 2022-05-25 VITALS — BODY MASS INDEX: 35.38 KG/M2 | WEIGHT: 212.38 LBS | HEIGHT: 65 IN

## 2022-05-25 VITALS — DIASTOLIC BLOOD PRESSURE: 77 MMHG | SYSTOLIC BLOOD PRESSURE: 131 MMHG

## 2022-05-25 VITALS — SYSTOLIC BLOOD PRESSURE: 135 MMHG | DIASTOLIC BLOOD PRESSURE: 58 MMHG

## 2022-05-25 DIAGNOSIS — Z79.891: ICD-10-CM

## 2022-05-25 DIAGNOSIS — I10: ICD-10-CM

## 2022-05-25 DIAGNOSIS — Z79.2: ICD-10-CM

## 2022-05-25 DIAGNOSIS — N64.4: Primary | ICD-10-CM

## 2022-05-25 DIAGNOSIS — Z79.899: ICD-10-CM

## 2022-05-25 DIAGNOSIS — Z91.013: ICD-10-CM

## 2022-05-25 DIAGNOSIS — Z98.890: ICD-10-CM

## 2022-05-25 PROCEDURE — 96372 THER/PROPH/DIAG INJ SC/IM: CPT

## 2022-05-25 PROCEDURE — 99283 EMERGENCY DEPT VISIT LOW MDM: CPT
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
## 2022-05-25 NOTE — NUR
Patient discharged with v/s stable. Written and verbal after care instructions 
ABOUT BREAST CYST given and explained. 

Patient alert, oriented and verbalized understanding of instructions. 
Ambulatory with steady gait. All questions addressed prior to discharge. ID 
band removed. Patient advised to follow up with PMD. Rx of NORCO 5-325, 
IBUPROFEN given. Patient educated on indication of medication including 
possible reaction and side effects. Opportunity to ask questions provided and 
answered.

## 2022-06-20 ENCOUNTER — HOSPITAL ENCOUNTER (EMERGENCY)
Dept: HOSPITAL 26 - MED | Age: 47
Discharge: HOME | End: 2022-06-20
Payer: MEDICAID

## 2022-06-20 VITALS — DIASTOLIC BLOOD PRESSURE: 77 MMHG | SYSTOLIC BLOOD PRESSURE: 115 MMHG

## 2022-06-20 VITALS — SYSTOLIC BLOOD PRESSURE: 115 MMHG | DIASTOLIC BLOOD PRESSURE: 77 MMHG

## 2022-06-20 VITALS — BODY MASS INDEX: 34.15 KG/M2 | HEIGHT: 64 IN | WEIGHT: 200 LBS

## 2022-06-20 DIAGNOSIS — F10.129: Primary | ICD-10-CM

## 2022-06-20 DIAGNOSIS — Z98.890: ICD-10-CM

## 2022-06-20 DIAGNOSIS — Z91.013: ICD-10-CM
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
## 2022-06-20 DIAGNOSIS — M54.30: ICD-10-CM

## 2022-06-20 DIAGNOSIS — I10: ICD-10-CM

## 2022-06-20 PROCEDURE — 96372 THER/PROPH/DIAG INJ SC/IM: CPT

## 2022-06-20 PROCEDURE — 81025 URINE PREGNANCY TEST: CPT

## 2022-06-20 PROCEDURE — 81002 URINALYSIS NONAUTO W/O SCOPE: CPT

## 2022-06-20 PROCEDURE — 99283 EMERGENCY DEPT VISIT LOW MDM: CPT

## 2022-06-20 NOTE — NUR
45YO FEMALE PT BIBA FROM HOME. UPON ARRIVAL PT WAS CRYING , MILD SLURRING IN 
WORDS , FLUSHED SKIN, STEADY GAIT . PT WAS STATING SHE WAS FEELING "DEPRESSED 
AND STRESSED" PT C/O DIZZINESS AND HEADACHE. PRIOR ARRIVAL PT STATES DRINKING 
HALF PINT OF VODKA . PT PRESENTS WITH NO VISIBLE INJURIES AND MILD CONFUSION, 
WILL TALK OUT LOUD ABOUT RANDOM SUBJECTS. 



HX; DEPRESSION

ALLERGIES: SHELLFISH

## 2022-07-06 ENCOUNTER — HOSPITAL ENCOUNTER (EMERGENCY)
Dept: HOSPITAL 26 - MED | Age: 47
LOS: 1 days | Discharge: LEFT BEFORE DISCHARGE | End: 2022-07-07
Payer: MEDICAID

## 2022-07-06 VITALS — WEIGHT: 180 LBS | HEIGHT: 65 IN | BODY MASS INDEX: 29.99 KG/M2

## 2022-07-06 VITALS — DIASTOLIC BLOOD PRESSURE: 72 MMHG | SYSTOLIC BLOOD PRESSURE: 118 MMHG

## 2022-07-06 DIAGNOSIS — Z53.21: ICD-10-CM

## 2022-07-06 DIAGNOSIS — R51.9: Primary | ICD-10-CM

## 2022-07-06 LAB
ALBUMIN FLD-MCNC: 3 G/DL (ref 3.4–5)
ANION GAP SERPL CALCULATED.3IONS-SCNC: 16.7 MMOL/L (ref 8–16)
AST SERPL-CCNC: 82 U/L (ref 15–37)
BASOPHILS # BLD AUTO: 0.1 K/UL (ref 0–0.22)
BASOPHILS NFR BLD AUTO: 0.6 % (ref 0–2)
BILIRUB SERPL-MCNC: 0.3 MG/DL (ref 0–1)
BUN SERPL-MCNC: 9 MG/DL (ref 7–18)
CHLORIDE SERPL-SCNC: 110 MMOL/L (ref 98–107)
CO2 SERPL-SCNC: 19.9 MMOL/L (ref 21–32)
CREAT SERPL-MCNC: 0.7 MG/DL (ref 0.6–1.3)
EOSINOPHIL # BLD AUTO: 0.1 K/UL (ref 0–0.4)
EOSINOPHIL NFR BLD AUTO: 1.3 % (ref 0–4)
ERYTHROCYTE [DISTWIDTH] IN BLOOD BY AUTOMATED COUNT: 14.2 % (ref 11.6–13.7)
GFR SERPL CREATININE-BSD FRML MDRD: 115 ML/MIN (ref 90–?)
GLUCOSE SERPL-MCNC: 130 MG/DL (ref 74–106)
HCT VFR BLD AUTO: 37 % (ref 36–48)
HGB BLD-MCNC: 12.8 G/DL (ref 12–16)
LIPASE SERPL-CCNC: 125 U/L (ref 73–393)
LYMPHOCYTES # BLD AUTO: 1.8 K/UL (ref 2.5–16.5)
LYMPHOCYTES NFR BLD AUTO: 19.1 % (ref 20.5–51.1)
MCH RBC QN AUTO: 34 PG (ref 27–31)
MCHC RBC AUTO-ENTMCNC: 35 G/DL (ref 33–37)
MCV RBC AUTO: 99.3 FL (ref 80–94)
MONOCYTES # BLD AUTO: 0.6 K/UL (ref 0.8–1)
MONOCYTES NFR BLD AUTO: 6.7 % (ref 1.7–9.3)
NEUTROPHILS # BLD AUTO: 6.8 K/UL (ref 1.8–7.7)
NEUTROPHILS NFR BLD AUTO: 72.3 % (ref 42.2–75.2)
PLATELET # BLD AUTO: 281 K/UL (ref 140–450)
POTASSIUM SERPL-SCNC: 2.6 MMOL/L (ref 3.5–5.1)
RBC # BLD AUTO: 3.72 MIL/UL (ref 4.2–5.4)
SODIUM SERPL-SCNC: 144 MMOL/L (ref 136–145)
WBC # BLD AUTO: 9.5 K/UL (ref 4.8–10.8)

## 2022-07-07 LAB
MAGNESIUM SERPL-MCNC: 1.7 MG/DL (ref 1.8–2.4)
PHOSPHATE SERPL-MCNC: 2.8 MG/DL (ref 2.5–4.9)

## 2022-08-30 ENCOUNTER — HOSPITAL ENCOUNTER (EMERGENCY)
Dept: HOSPITAL 26 - MED | Age: 47
Discharge: LEFT BEFORE DISCHARGE | End: 2022-08-30
Payer: MEDICAID

## 2022-08-30 VITALS — HEIGHT: 66 IN | BODY MASS INDEX: 32.62 KG/M2 | WEIGHT: 203 LBS

## 2022-08-30 VITALS — DIASTOLIC BLOOD PRESSURE: 71 MMHG | SYSTOLIC BLOOD PRESSURE: 133 MMHG

## 2022-08-30 DIAGNOSIS — Z53.21: ICD-10-CM

## 2022-08-30 DIAGNOSIS — M54.9: Primary | ICD-10-CM

## 2022-08-30 DIAGNOSIS — M79.601: ICD-10-CM

## 2023-08-11 ENCOUNTER — HOSPITAL ENCOUNTER (EMERGENCY)
Dept: HOSPITAL 4 - SED | Age: 48
Discharge: HOME | End: 2023-08-11
Payer: MEDICAID

## 2023-08-11 VITALS
HEART RATE: 88 BPM | OXYGEN SATURATION: 96 % | TEMPERATURE: 98.1 F | RESPIRATION RATE: 20 BRPM | SYSTOLIC BLOOD PRESSURE: 122 MMHG

## 2023-08-11 VITALS — WEIGHT: 240 LBS | HEIGHT: 65 IN | BODY MASS INDEX: 39.99 KG/M2

## 2023-08-11 DIAGNOSIS — I10: ICD-10-CM

## 2023-08-11 DIAGNOSIS — Z79.899: ICD-10-CM

## 2023-08-11 DIAGNOSIS — R60.0: Primary | ICD-10-CM

## 2023-08-11 LAB
ALBUMIN SERPL BCP-MCNC: 3.4 G/DL (ref 3.4–4.8)
ALT SERPL W P-5'-P-CCNC: 20 U/L (ref 12–78)
ANION GAP SERPL CALCULATED.3IONS-SCNC: 8 MMOL/L (ref 5–15)
AST SERPL W P-5'-P-CCNC: 21 U/L (ref 10–37)
BASOPHILS # BLD AUTO: 0.1 K/UL (ref 0–0.2)
BASOPHILS NFR BLD AUTO: 1 % (ref 0–2)
BILIRUB SERPL-MCNC: 0.4 MG/DL (ref 0–1)
BUN SERPL-MCNC: 13 MG/DL (ref 8–21)
CALCIUM SERPL-MCNC: 8.6 MG/DL (ref 8.4–11)
CHLORIDE SERPL-SCNC: 104 MMOL/L (ref 98–107)
CK SERPL-CCNC: 187 U/L (ref 26–192)
CO2 SERPLBLD-SCNC: 26 MMOL/L (ref 23–29)
CREAT SERPL-MCNC: 0.66 MG/DL (ref 0.55–1.3)
EOSINOPHIL # BLD AUTO: 0.3 K/UL (ref 0–0.4)
EOSINOPHIL NFR BLD AUTO: 3.6 % (ref 0–4)
ERYTHROCYTE [DISTWIDTH] IN BLOOD BY AUTOMATED COUNT: 14.3 % (ref 9–15)
GFR SERPL CREATININE-BSD FRML MDRD: 122 ML/MIN (ref 90–?)
GLUCOSE SERPL-MCNC: 106 MG/DL (ref 74–106)
HCT VFR BLD AUTO: 36.5 % (ref 36–48)
HGB BLD-MCNC: 12.2 G/DL (ref 12–16)
INR PPP: 1 (ref 0.8–1.2)
LYMPHOCYTES # BLD AUTO: 2.2 K/UL (ref 1–5.5)
LYMPHOCYTES NFR BLD AUTO: 22.6 % (ref 20.5–51.5)
MCH RBC QN AUTO: 30 PG (ref 27–31)
MCHC RBC AUTO-ENTMCNC: 33 % (ref 32–36)
MCV RBC AUTO: 91 FL (ref 79–98)
MONOCYTES # BLD MANUAL: 1 K/UL (ref 0–1)
MONOCYTES # BLD MANUAL: 10.1 % (ref 1.7–9.3)
NEUTROPHILS # BLD AUTO: 6 K/UL (ref 1.8–7.7)
NEUTROPHILS NFR BLD AUTO: 62.7 % (ref 40–70)
PLATELET # BLD AUTO: 257 K/UL (ref 130–430)
POTASSIUM SERPL-SCNC: 4.2 MMOL/L (ref 3.5–5.1)
PROT SERPL-MCNC: 6.9 G/DL (ref 6.4–8.3)
PROTHROMBIN TIME: 9.9 SECS (ref 9.5–12.5)
RBC # BLD AUTO: 4.03 MIL/UL (ref 4.2–6.2)
SODIUM SERPLBLD-SCNC: 138 MMOL/L (ref 136–145)
WBC # BLD AUTO: 9.5 K/UL (ref 4.8–10.8)

## 2024-06-09 ENCOUNTER — HOSPITAL ENCOUNTER (EMERGENCY)
Dept: HOSPITAL 26 - MED | Age: 49
Discharge: HOME | End: 2024-06-09
Payer: MEDICAID

## 2024-06-09 VITALS
HEART RATE: 123 BPM | RESPIRATION RATE: 18 BRPM | OXYGEN SATURATION: 96 % | TEMPERATURE: 98.1 F | DIASTOLIC BLOOD PRESSURE: 95 MMHG | SYSTOLIC BLOOD PRESSURE: 140 MMHG

## 2024-06-09 VITALS — HEIGHT: 65 IN | BODY MASS INDEX: 41.65 KG/M2 | WEIGHT: 250 LBS

## 2024-06-09 DIAGNOSIS — S46.811A: Primary | ICD-10-CM
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
## 2024-06-09 DIAGNOSIS — I10: ICD-10-CM
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
## 2024-06-09 DIAGNOSIS — S50.01XA: ICD-10-CM

## 2024-06-09 DIAGNOSIS — Z85.3: ICD-10-CM
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
## 2024-06-09 DIAGNOSIS — Z91.013: ICD-10-CM

## 2024-06-09 PROCEDURE — 73030 X-RAY EXAM OF SHOULDER: CPT

## 2024-06-09 PROCEDURE — 73080 X-RAY EXAM OF ELBOW: CPT

## 2024-06-09 PROCEDURE — 96372 THER/PROPH/DIAG INJ SC/IM: CPT

## 2024-06-09 PROCEDURE — 99284 EMERGENCY DEPT VISIT MOD MDM: CPT

## 2024-06-09 RX ADMIN — HYDROCODONE BITARTRATE AND ACETAMINOPHEN ONE TAB: 5; 325 TABLET ORAL at 20:18

## 2024-06-09 RX ADMIN — KETOROLAC TROMETHAMINE ONE MG: 60 INJECTION, SOLUTION INTRAMUSCULAR at 20:17
